# Patient Record
Sex: FEMALE | Race: WHITE | NOT HISPANIC OR LATINO | Employment: FULL TIME | URBAN - METROPOLITAN AREA
[De-identification: names, ages, dates, MRNs, and addresses within clinical notes are randomized per-mention and may not be internally consistent; named-entity substitution may affect disease eponyms.]

---

## 2021-03-01 ENCOUNTER — OFFICE VISIT (OUTPATIENT)
Dept: OBGYN CLINIC | Facility: CLINIC | Age: 36
End: 2021-03-01
Payer: COMMERCIAL

## 2021-03-01 VITALS
SYSTOLIC BLOOD PRESSURE: 130 MMHG | WEIGHT: 293 LBS | DIASTOLIC BLOOD PRESSURE: 80 MMHG | HEIGHT: 64 IN | BODY MASS INDEX: 50.02 KG/M2

## 2021-03-01 DIAGNOSIS — N83.209 CYST OF OVARY, UNSPECIFIED LATERALITY: Primary | ICD-10-CM

## 2021-03-01 DIAGNOSIS — Z01.419 ENCOUNTER FOR ANNUAL ROUTINE GYNECOLOGICAL EXAMINATION: ICD-10-CM

## 2021-03-01 DIAGNOSIS — Z01.419 CERVICAL SMEAR, AS PART OF ROUTINE GYNECOLOGICAL EXAMINATION: ICD-10-CM

## 2021-03-01 PROCEDURE — S0610 ANNUAL GYNECOLOGICAL EXAMINA: HCPCS | Performed by: OBSTETRICS & GYNECOLOGY

## 2021-03-01 RX ORDER — ESCITALOPRAM OXALATE 10 MG/1
10 TABLET ORAL DAILY
COMMUNITY

## 2021-03-01 NOTE — PROGRESS NOTES
Assessment/Plan:  New patient  Normal breast and gyn exam   IUD string seen  Family history colon cancer (mother)  48 lb weight gain since COVID pandemic  Mild depression treated with Lexapro x1 month with improvement of symptoms  Status post  x2  Considering pregnancy in the next year so  History of ovarian cyst, due for follow-up ultrasound  (Side unknown )    Plan:  Check pelvic ultrasound for follow-up of cyst bladder allergy unknown  Recommend healthy diet and exercise  Recommend vitamin-C vitamin-D and zinc   Recommend COVID-19 vaccine when available  Subjective:      Patient ID: Xander Aden is a 38 y o  female presents as new patient to the office  Patient transferred from a practice in [place] since she moved to this area  Patient states that at her yearly exam a year ago the IUD string was not seen  Patient had an ultrasound 2019 which showed the IUD in place however and ovarian cyst was noted  Patient does not know which side  A follow-up ultrasound in 2020 showed the cyst still present  She denies any pelvic pain  Her IUD is working well she denies any heavy bleeding  She has a history of 2 C sections and was considering getting pregnant in next year so  Repeat  was discussed  Patient has been depressed during this COVID episode over the last year she has gained 50 lb  She is not exercising regularly her watching her diet  She was followed recently for mild depression was placed on 10 mg of Lexapro  She has been on this dose 1 month since has seen an improvement  She has a follow-up with her PMD   She denies any breast bowel or bladder issues  No change in family history  Medications reviewed  Her C-sections were  and   Review of Systems   Constitutional: Negative  Negative for fatigue, fever and unexpected weight change  HENT: Negative  Eyes: Negative  Respiratory: Negative    Negative for chest tightness, shortness of breath, wheezing and stridor  Cardiovascular: Negative  Negative for chest pain, palpitations and leg swelling  Gastrointestinal: Negative  Negative for abdominal pain, blood in stool, diarrhea, nausea, rectal pain and vomiting  Endocrine: Negative  Genitourinary: Negative for dysuria, frequency, vaginal bleeding, vaginal discharge and vaginal pain  Musculoskeletal: Negative  Skin: Negative  Allergic/Immunologic: Negative  Neurological: Negative  Hematological: Negative  Psychiatric/Behavioral: Negative  All other systems reviewed and are negative  Objective:      /80   Ht 5' 4 17" (1 63 m)   Wt (!) 140 kg (308 lb)   LMP  (LMP Unknown)   BMI 52 58 kg/m²          Physical Exam  Constitutional:       Appearance: She is well-developed  HENT:      Head: Normocephalic and atraumatic  Neck:      Musculoskeletal: Normal range of motion and neck supple  Thyroid: No thyromegaly  Trachea: No tracheal deviation  Cardiovascular:      Rate and Rhythm: Normal rate and regular rhythm  Heart sounds: Normal heart sounds  Pulmonary:      Effort: Pulmonary effort is normal  No respiratory distress  Breath sounds: Normal breath sounds  No stridor  No wheezing or rales  Chest:      Chest wall: No tenderness  Breasts: Breasts are symmetrical          Right: No inverted nipple, mass, nipple discharge, skin change or tenderness  Left: No inverted nipple, mass, nipple discharge, skin change or tenderness  Abdominal:      General: Bowel sounds are normal  There is no distension  Palpations: Abdomen is soft  There is no mass  Tenderness: There is no abdominal tenderness  There is no guarding or rebound  Hernia: No hernia is present  There is no hernia in the left inguinal area  Genitourinary:     Labia:         Right: No rash, tenderness, lesion or injury  Left: No rash, tenderness, lesion or injury  Vagina: Normal  No signs of injury and foreign body  No vaginal discharge, erythema, tenderness or bleeding  Cervix: No cervical motion tenderness, discharge or friability  Uterus: Not deviated, not enlarged, not fixed and not tender  Adnexa:         Right: No mass, tenderness or fullness  Left: No mass, tenderness or fullness  Rectum: No mass, anal fissure, external hemorrhoid or internal hemorrhoid  Comments: Normal urethra  IUD string seen protruding from the cervical os  Lymphadenopathy:      Lower Body: No right inguinal adenopathy  No left inguinal adenopathy  Skin:     General: Skin is warm and dry  Neurological:      Mental Status: She is alert and oriented to person, place, and time  Psychiatric:         Behavior: Behavior normal          Thought Content:  Thought content normal          Judgment: Judgment normal

## 2021-03-01 NOTE — PROGRESS NOTES
The patient is here for a yearly  She is new to our office  The patient does want a pap smear- the patient wants us to hold it until she finds out when her last pap smear was  No bleeding or cramping  The patient had an ultrasound at [facility] and they found an ovarian cyst  The patient has no pelvic pain  The patient saw Dr Regino Ibarra of [facility] in [address] [place] [address]

## 2021-03-03 LAB — THIN PREP CVX: NORMAL

## 2021-03-05 ENCOUNTER — TELEPHONE (OUTPATIENT)
Dept: OBGYN CLINIC | Facility: CLINIC | Age: 36
End: 2021-03-05

## 2021-03-05 NOTE — TELEPHONE ENCOUNTER
Pt called to check if we received her records from her other doctor  We have and they are in pt's chart  Please review

## 2021-05-03 ENCOUNTER — IMMUNIZATIONS (OUTPATIENT)
Dept: FAMILY MEDICINE CLINIC | Facility: HOSPITAL | Age: 36
End: 2021-05-03

## 2021-05-03 DIAGNOSIS — Z23 ENCOUNTER FOR IMMUNIZATION: Primary | ICD-10-CM

## 2021-05-03 PROCEDURE — 0001A SARS-COV-2 / COVID-19 MRNA VACCINE (PFIZER-BIONTECH) 30 MCG: CPT

## 2021-05-03 PROCEDURE — 91300 SARS-COV-2 / COVID-19 MRNA VACCINE (PFIZER-BIONTECH) 30 MCG: CPT

## 2021-05-27 ENCOUNTER — IMMUNIZATIONS (OUTPATIENT)
Dept: FAMILY MEDICINE CLINIC | Facility: HOSPITAL | Age: 36
End: 2021-05-27

## 2021-05-27 DIAGNOSIS — Z23 ENCOUNTER FOR IMMUNIZATION: Primary | ICD-10-CM

## 2021-05-27 PROCEDURE — 91300 SARS-COV-2 / COVID-19 MRNA VACCINE (PFIZER-BIONTECH) 30 MCG: CPT

## 2021-05-27 PROCEDURE — 0002A SARS-COV-2 / COVID-19 MRNA VACCINE (PFIZER-BIONTECH) 30 MCG: CPT

## 2021-07-28 ENCOUNTER — TELEPHONE (OUTPATIENT)
Dept: UROLOGY | Facility: AMBULATORY SURGERY CENTER | Age: 36
End: 2021-07-28

## 2021-07-28 NOTE — TELEPHONE ENCOUNTER
Please Triage - [address] Patient-     What is the reason for the patients appointment? Micro hematuria       Imaging/Lab Results:      Do we accept the patient's insurance or is the patient Self-Pay? Provider & Plan: Maria C Otto  Member ID#: [id number]     Has the patient had any previous urologist(s)? No        Have patient records been requested?  Pt will have pcp fax over her records to [phone]       Has the patient had any outside testing done? no      Does the patient have a personal history of cancer? no      Patient can be reached at :[phone]

## 2021-07-29 ENCOUNTER — TELEPHONE (OUTPATIENT)
Dept: UROLOGY | Facility: CLINIC | Age: 36
End: 2021-07-29

## 2021-08-11 NOTE — PROGRESS NOTES
2021      Chief Complaint   Patient presents with    Microhematuria     Assessment and Plan    39 y o  female new patient     1  Microscopic hematuria  - UA micro from 21 showed 3-10 RBCs  Additionally UA micro from 21 showed 3-10 RBCs  Both samples in absence of UTI     - Given risk stratification will obtain renal US and will return for cystoscopy for further evaluation    - Urine dip + for blood and leukocytes today, negative nitrites  Will send out for UA micro  - Return for cystoscopy  History of Present Illness  Damián Conner is a 39 y o  female here for new patient evaluation of microscopic hematuria  UA micro from 21 showed 3-10 RBCs  Additionally UA micro from 21 showed 3-10 RBCs  Both samples in absence of UTI  She denies every smoking  Does report a family history of prostate cancer in her grandfather  Denies any family history of other  malignancy  Denies any history of chemical exposures  She denies any urinary symptoms such as dysuria, frequency, urgency, incontinence, flank pain, suprapubic pain, or gross hematuria  She denies any prior urologic history or prior  surgical manipulation  Denies any blood thinners or prior pelvic trauma  She has history of having 2 children via   She does have Mirena IUD in place and reports she does not have regular menstrual periods, does occasionally have vaginal spotting  She denies any medical history other than morbid obesity  Urine dip positive for leukocytes and blood  Negative for nitrites  PVR=11 mL     Review of Systems   Constitutional: Negative for chills and fever  Respiratory: Negative for shortness of breath  Cardiovascular: Negative for chest pain  Gastrointestinal: Negative for abdominal pain, constipation, diarrhea, nausea and vomiting     Genitourinary: Negative for difficulty urinating, dysuria, flank pain, frequency, hematuria, pelvic pain, urgency, vaginal bleeding and vaginal pain    Skin: Negative for rash  Allergic/Immunologic: Negative for immunocompromised state  Neurological: Negative for dizziness  Hematological: Does not bruise/bleed easily  Past Medical History  History reviewed  No pertinent past medical history  Past Social History  Past Surgical History:   Procedure Laterality Date     SECTION       and      Social History     Tobacco Use   Smoking Status Never Smoker   Smokeless Tobacco Never Used       Past Family History  Family History   Problem Relation Age of Onset    Colon cancer Maternal Grandmother     Breast cancer Family        Past Social history  Social History     Socioeconomic History    Marital status: /Civil Union     Spouse name: Not on file    Number of children: Not on file    Years of education: Not on file    Highest education level: Not on file   Occupational History    Not on file   Tobacco Use    Smoking status: Never Smoker    Smokeless tobacco: Never Used   Vaping Use    Vaping Use: Never used   Substance and Sexual Activity    Alcohol use: Yes    Drug use: Not on file    Sexual activity: Yes     Birth control/protection: I U D  Other Topics Concern    Not on file   Social History Narrative    Not on file     Social Determinants of Health     Financial Resource Strain:     Difficulty of Paying Living Expenses:    Food Insecurity:     Worried About Running Out of Food in the Last Year:     [address] in the Last Year:    Transportation Needs:     Lack of Transportation (Medical):      Lack of Transportation (Non-Medical):    Physical Activity:     Days of Exercise per Week:     Minutes of Exercise per Session:    Stress:     Feeling of Stress :    Social Connections:     Frequency of Communication with Friends and Family:     Frequency of Social Gatherings with Friends and Family:     Attends Evangelical Services:     Active Member of Clubs or Organizations:     Attends Club or Organization Meetings:     Marital Status:    Intimate Partner Violence:     Fear of Current or Ex-Partner:     Emotionally Abused:     Physically Abused:     Sexually Abused:        Current Medications  Current Outpatient Medications   Medication Sig Dispense Refill    escitalopram (LEXAPRO) 10 mg tablet Take 10 mg by mouth daily      levonorgestrel (MIRENA) 20 MCG/24HR IUD 1 each by Intrauterine route once       No current facility-administered medications for this visit  Allergies  No Known Allergies      The following portions of the patient's history were reviewed and updated as appropriate: allergies, current medications, past medical history, past social history, past surgical history and problem list       Vitals  Vitals:    08/12/21 1053   BP: 122/86   BP Location: Left arm   Patient Position: Sitting   Cuff Size: Large   Pulse: 80   Weight: (!) 142 kg (312 lb)   Height: 5' 4" (1 626 m)           Physical Exam  Physical Exam  Constitutional:       Appearance: Normal appearance  She is obese  HENT:      Head: Normocephalic and atraumatic  Right Ear: External ear normal       Left Ear: External ear normal    Eyes:      General: No scleral icterus  Conjunctiva/sclera: Conjunctivae normal    Cardiovascular:      Pulses: Normal pulses  Pulmonary:      Effort: Pulmonary effort is normal    Musculoskeletal:         General: Normal range of motion  Cervical back: Normal range of motion  Skin:     General: Skin is warm and dry  Neurological:      General: No focal deficit present  Mental Status: She is alert and oriented to person, place, and time  Psychiatric:         Mood and Affect: Mood normal          Behavior: Behavior normal          Thought Content:  Thought content normal          Judgment: Judgment normal            Results  Recent Results (from the past 1 hour(s))   POCT urine dip    Collection Time: 08/12/21 10:56 AM   Result Value Ref Range    LEUKOCYTE ESTERASE,UA ++     NITRITE,UA -     SL AMB POCT UROBILINOGEN 0 2     POCT URINE PROTEIN -      PH,UA 7 0     BLOOD,UA ++     SPECIFIC GRAVITY,UA 1 015     KETONES,UA -     BILIRUBIN,UA -     GLUCOSE, UA -      COLOR,UA yellow     CLARITY,UA clear    POCT Measure PVR    Collection Time: 08/12/21 10:57 AM   Result Value Ref Range    POST-VOID RESIDUAL VOLUME, ML POC 11 mL   ]  No results found for: PSA  No results found for: GLUCOSE, CALCIUM, NA, K, CO2, CL, BUN, CREATININE  No results found for: WBC, HGB, HCT, MCV, PLT        Orders  Orders Placed This Encounter   Procedures    POCT Measure PVR    POCT urine dip       Troy Marin PA-C

## 2021-08-12 ENCOUNTER — CONSULT (OUTPATIENT)
Dept: UROLOGY | Facility: CLINIC | Age: 36
End: 2021-08-12
Payer: COMMERCIAL

## 2021-08-12 VITALS
WEIGHT: 293 LBS | BODY MASS INDEX: 50.02 KG/M2 | SYSTOLIC BLOOD PRESSURE: 122 MMHG | HEART RATE: 80 BPM | DIASTOLIC BLOOD PRESSURE: 86 MMHG | HEIGHT: 64 IN

## 2021-08-12 DIAGNOSIS — R31.29 MICROSCOPIC HEMATURIA: Primary | ICD-10-CM

## 2021-08-12 LAB
BACTERIA UR QL AUTO: ABNORMAL /HPF
BILIRUB UR QL STRIP: NEGATIVE
CLARITY UR: ABNORMAL
COLOR UR: YELLOW
GLUCOSE UR STRIP-MCNC: NEGATIVE MG/DL
HGB UR QL STRIP.AUTO: ABNORMAL
HYALINE CASTS #/AREA URNS LPF: ABNORMAL /LPF
KETONES UR STRIP-MCNC: NEGATIVE MG/DL
LEUKOCYTE ESTERASE UR QL STRIP: ABNORMAL
NITRITE UR QL STRIP: NEGATIVE
NON-SQ EPI CELLS URNS QL MICRO: ABNORMAL /HPF
PH UR STRIP.AUTO: 7 [PH]
POST-VOID RESIDUAL VOLUME, ML POC: 11 ML
PROT UR STRIP-MCNC: NEGATIVE MG/DL
RBC #/AREA URNS AUTO: ABNORMAL /HPF
SL AMB  POCT GLUCOSE, UA: NORMAL
SL AMB LEUKOCYTE ESTERASE,UA: NORMAL
SL AMB POCT BILIRUBIN,UA: NORMAL
SL AMB POCT BLOOD,UA: NORMAL
SL AMB POCT CLARITY,UA: CLEAR
SL AMB POCT COLOR,UA: YELLOW
SL AMB POCT KETONES,UA: NORMAL
SL AMB POCT NITRITE,UA: NORMAL
SL AMB POCT PH,UA: 7
SL AMB POCT SPECIFIC GRAVITY,UA: 1.01
SL AMB POCT URINE PROTEIN: NORMAL
SL AMB POCT UROBILINOGEN: 0.2
SP GR UR STRIP.AUTO: 1.02 (ref 1–1.03)
UROBILINOGEN UR QL STRIP.AUTO: 0.2 E.U./DL
WBC #/AREA URNS AUTO: ABNORMAL /HPF

## 2021-08-12 PROCEDURE — 51798 US URINE CAPACITY MEASURE: CPT | Performed by: PHYSICIAN ASSISTANT

## 2021-08-12 PROCEDURE — 1036F TOBACCO NON-USER: CPT | Performed by: PHYSICIAN ASSISTANT

## 2021-08-12 PROCEDURE — 81002 URINALYSIS NONAUTO W/O SCOPE: CPT | Performed by: PHYSICIAN ASSISTANT

## 2021-08-12 PROCEDURE — 99203 OFFICE O/P NEW LOW 30 MIN: CPT | Performed by: PHYSICIAN ASSISTANT

## 2021-08-12 PROCEDURE — 81001 URINALYSIS AUTO W/SCOPE: CPT | Performed by: PHYSICIAN ASSISTANT

## 2021-08-16 ENCOUNTER — HOSPITAL ENCOUNTER (OUTPATIENT)
Dept: ULTRASOUND IMAGING | Facility: HOSPITAL | Age: 36
Discharge: HOME | End: 2021-08-16
Payer: COMMERCIAL

## 2021-08-16 DIAGNOSIS — R31.29 MICROSCOPIC HEMATURIA: ICD-10-CM

## 2021-08-16 PROCEDURE — 76770 US EXAM ABDO BACK WALL COMP: CPT

## 2021-08-19 ENCOUNTER — TELEPHONE (OUTPATIENT)
Dept: UROLOGY | Facility: CLINIC | Age: 36
End: 2021-08-19

## 2021-08-19 NOTE — TELEPHONE ENCOUNTER
Left message to schedule cysto with Dr Andrew Jaime  Patient completed renal ultrasound that needed to be done prior to appointment      (Return for cystoscopy (non-urgent), obtain renal US)

## 2021-09-01 PROBLEM — R31.29 MICROSCOPIC HEMATURIA: Status: ACTIVE | Noted: 2021-09-01

## 2021-09-01 PROCEDURE — 52000 CYSTOURETHROSCOPY: CPT | Performed by: UROLOGY

## 2021-09-01 NOTE — ASSESSMENT & PLAN NOTE
The patient has had a negative hematuria workup  Recommend annual urinalysis with PCP and consideration for repeat workup in 3-5 years if micro hematuria persists  If the patient has gross hematuria then they should see us immediately

## 2021-09-01 NOTE — PROGRESS NOTES
Assessment/Plan:    Microscopic hematuria  The patient has had a negative hematuria workup  Recommend annual urinalysis with PCP and consideration for repeat workup in 3-5 years if micro hematuria persists  If the patient has gross hematuria then they should see us immediately  Subjective:      Patient ID: Michael Holcomb is a 39 y o  female  HPI    Michael Holcomb is a 39 y o  female here for new patient evaluation of microscopic hematuria  UA micro from 21 showed 3-10 RBCs  Additionally UA micro from 21 showed 3-10 RBCs  Both samples in absence of UTI  She denies ever smoking  Does report a family history of prostate cancer in her grandfather  Denies any family history of other  malignancy  Denies any history of chemical exposures  She denies any urinary symptoms such as dysuria, frequency, urgency, incontinence, flank pain, suprapubic pain, or gross hematuria  She denies any prior urologic history or prior  surgical manipulation  Denies any blood thinners or prior pelvic trauma  She has history of having 2 children via   She does have Mirena IUD in place and reports she does not have regular menstrual periods, does occasionally have vaginal spotting  She denies any medical history other than morbid obesity  Renal bladder ultrasound performed based on risk stratification and was unremarkable  Patient here for cystoscopy today         PVR=11 mL at prior visit      Review of Systems   Constitutional: Negative for chills and fever  HENT: Negative for ear pain and sore throat  Eyes: Negative for pain and visual disturbance  Respiratory: Negative for cough and shortness of breath  Cardiovascular: Negative for chest pain and palpitations  Gastrointestinal: Negative for abdominal pain and vomiting  Genitourinary: Negative for dysuria and hematuria  Musculoskeletal: Negative for arthralgias and back pain     Skin: Negative for color change and rash    Neurological: Negative for seizures and syncope  All other systems reviewed and are negative  Objective:      /70   Pulse 96   Ht 5' 4" (1 626 m)   Wt (!) 141 kg (310 lb)   BMI 53 21 kg/m²          Physical Exam  Vitals reviewed  Constitutional:       General: She is not in acute distress  Appearance: Normal appearance  She is obese  She is not ill-appearing, toxic-appearing or diaphoretic  HENT:      Head: Normocephalic and atraumatic  Eyes:      Extraocular Movements: Extraocular movements intact  Pupils: Pupils are equal, round, and reactive to light  Pulmonary:      Effort: Pulmonary effort is normal    Abdominal:      General: Abdomen is flat  There is no distension  Palpations: Abdomen is soft  There is no mass  Tenderness: There is no abdominal tenderness  There is no guarding or rebound  Hernia: No hernia is present  Skin:     General: Skin is warm  Neurological:      General: No focal deficit present  Mental Status: She is alert and oriented to person, place, and time  Mental status is at baseline  Psychiatric:         Mood and Affect: Mood normal          Behavior: Behavior normal          Thought Content: Thought content normal            RENAL ULTRASOUND     INDICATION:   R31 29: Other microscopic hematuria      COMPARISON: None     TECHNIQUE:   Ultrasound of the retroperitoneum was performed with a curvilinear transducer utilizing volumetric sweeps and still imaging techniques       FINDINGS:     KIDNEYS:  Symmetric and normal size  Right kidney:  11 6 x 4 4 x 4 5 cm  Left kidney:  11 7 x 4 6 x 5 cm      Right kidney  Normal echogenicity and contour  No suspicious masses detected  No hydronephrosis  No shadowing calculi  No perinephric fluid collections      Left kidney  Normal echogenicity and contour  No suspicious masses detected  No hydronephrosis  No shadowing calculi    No perinephric fluid collections      URETERS:  Nonvisualized      BLADDER:   Normally distended  No focal thickening or mass lesions  Bilateral ureteral jets detected         IMPRESSION:  1  No acute findings         Cystoscopy     Date/Time 9/1/2021 4:18 PM     Performed by  Beatrice Valenzuela MD     Authorized by Beatrice Valenzuela MD      Universal Protocol:  Consent: Written consent obtained  Risks and benefits: risks, benefits and alternatives were discussed  Consent given by: patient  Time out: Immediately prior to procedure a "time out" was called to verify the correct patient, procedure, equipment, support staff and site/side marked as required  Patient understanding: patient states understanding of the procedure being performed  Patient consent: the patient's understanding of the procedure matches consent given  Procedure consent: procedure consent matches procedure scheduled  Patient identity confirmed: verbally with patient        Procedure Details:  Procedure type: cystoscopy    Patient tolerance: Patient tolerated the procedure well with no immediate complications    Additional Procedure Details: A female MA was in the room and served as a chaperone and assistant    The patient's external genitals were sterilely prepped and draped  Viscous lidocaine was introduced into the urethra  A flexible cystoscope was introduced into the urethra    Urethra: Normal  Bladder: No lesions  Ureteral orifices in orthotopic position    No diverticula or trabeculations

## 2021-09-02 ENCOUNTER — PROCEDURE VISIT (OUTPATIENT)
Dept: UROLOGY | Facility: CLINIC | Age: 36
End: 2021-09-02
Payer: COMMERCIAL

## 2021-09-02 VITALS
DIASTOLIC BLOOD PRESSURE: 70 MMHG | SYSTOLIC BLOOD PRESSURE: 132 MMHG | BODY MASS INDEX: 50.02 KG/M2 | HEIGHT: 64 IN | HEART RATE: 96 BPM | WEIGHT: 293 LBS

## 2021-09-02 DIAGNOSIS — R31.29 MICROSCOPIC HEMATURIA: Primary | ICD-10-CM

## 2021-09-02 PROCEDURE — 3008F BODY MASS INDEX DOCD: CPT | Performed by: PHYSICIAN ASSISTANT

## 2021-09-15 ENCOUNTER — TELEPHONE (OUTPATIENT)
Dept: UROLOGY | Facility: CLINIC | Age: 36
End: 2021-09-15

## 2021-09-15 NOTE — TELEPHONE ENCOUNTER
Pt is on the recall list for Dr Ton Dwyer- Return for cystoscopy (non-urgent), obtain renal US  Attempted to reach pt 3 times - certified letter sent

## 2021-09-20 NOTE — TELEPHONE ENCOUNTER
Patient called stating she already had a cysto done on 09/02 and she did not to have make a follow up unless any problems  She is doing fine

## 2022-03-03 ENCOUNTER — ANNUAL EXAM (OUTPATIENT)
Dept: OBGYN CLINIC | Facility: CLINIC | Age: 37
End: 2022-03-03
Payer: COMMERCIAL

## 2022-03-03 DIAGNOSIS — Z11.51 SCREENING FOR HPV (HUMAN PAPILLOMAVIRUS): ICD-10-CM

## 2022-03-03 DIAGNOSIS — Z01.419 ENCOUNTER FOR ANNUAL ROUTINE GYNECOLOGICAL EXAMINATION: ICD-10-CM

## 2022-03-03 DIAGNOSIS — Z01.419 ROUTINE GYNECOLOGICAL EXAMINATION: Primary | ICD-10-CM

## 2022-03-03 PROCEDURE — S0612 ANNUAL GYNECOLOGICAL EXAMINA: HCPCS | Performed by: OBSTETRICS & GYNECOLOGY

## 2022-03-03 NOTE — PROGRESS NOTES
Assessment/Plan:    Normal breast and gyn exam  IUD in place  Recently diagnosed with benign microscopic hematuria  Followed by Urology  Normal Pap smear 2021  COVID and flu vaccine    Plan:  Check Pap smear  Patient considering removing IUD in getting pregnant  Recommend prenatal vitamin  Recommend healthy diet exercise  Subjective:      Patient ID: Nola Bhatt is a 39 y o  female presents for yearly exam with no complaints  Patient presently has an IUD and has no pelvic pain or bleeding  Patient considering pregnancy in the next year  Denies any breast bowel or bladder issues  No change in family history  Maternal grandmother colon cancer  maternal cousin breast cancer  Medications reviewed  Review of Systems   Constitutional: Negative  Negative for fatigue, fever and unexpected weight change  HENT: Negative  Eyes: Negative  Respiratory: Negative  Negative for chest tightness, shortness of breath, wheezing and stridor  Cardiovascular: Negative  Negative for chest pain, palpitations and leg swelling  Gastrointestinal: Negative  Negative for abdominal pain, blood in stool, diarrhea, nausea, rectal pain and vomiting  Endocrine: Negative  Genitourinary: Negative for dysuria, frequency, vaginal bleeding, vaginal discharge and vaginal pain  Musculoskeletal: Negative  Skin: Negative  Allergic/Immunologic: Negative  Neurological: Negative  Hematological: Negative  Psychiatric/Behavioral: Negative  All other systems reviewed and are negative  Objective: There were no vitals taken for this visit  Physical Exam  Constitutional:       Appearance: She is well-developed  HENT:      Head: Normocephalic and atraumatic  Neck:      Thyroid: No thyromegaly  Trachea: No tracheal deviation  Cardiovascular:      Rate and Rhythm: Normal rate and regular rhythm  Heart sounds: Normal heart sounds     Pulmonary:      Effort: Pulmonary effort is normal  No respiratory distress  Breath sounds: Normal breath sounds  No stridor  No wheezing or rales  Chest:      Chest wall: No tenderness  Breasts: Breasts are symmetrical       Right: No inverted nipple, mass, nipple discharge, skin change or tenderness  Left: No inverted nipple, mass, nipple discharge, skin change or tenderness  Abdominal:      General: Bowel sounds are normal  There is no distension  Palpations: Abdomen is soft  There is no mass  Tenderness: There is no abdominal tenderness  There is no guarding or rebound  Hernia: No hernia is present  There is no hernia in the left inguinal area  Genitourinary:     Labia:         Right: No rash, tenderness, lesion or injury  Left: No rash, tenderness, lesion or injury  Vagina: Normal  No signs of injury and foreign body  No vaginal discharge, erythema, tenderness or bleeding  Cervix: No cervical motion tenderness, discharge or friability  Uterus: Not deviated, not enlarged, not fixed and not tender  Adnexa:         Right: No mass, tenderness or fullness  Left: No mass, tenderness or fullness  Rectum: No mass, anal fissure, external hemorrhoid or internal hemorrhoid  Musculoskeletal:      Cervical back: Normal range of motion and neck supple  Lymphadenopathy:      Lower Body: No right inguinal adenopathy  No left inguinal adenopathy  Skin:     General: Skin is warm and dry  Neurological:      Mental Status: She is alert and oriented to person, place, and time  Psychiatric:         Behavior: Behavior normal          Thought Content:  Thought content normal          Judgment: Judgment normal

## 2022-03-03 NOTE — PROGRESS NOTES
The patient is here for a yearly  The patient is due for a pap smear  Pap normal 3/1/21, pap normal HPV neg 11/13/17

## 2023-03-23 ENCOUNTER — ANNUAL EXAM (OUTPATIENT)
Dept: GYNECOLOGY | Facility: CLINIC | Age: 38
End: 2023-03-23

## 2023-03-23 VITALS
SYSTOLIC BLOOD PRESSURE: 110 MMHG | HEIGHT: 64 IN | WEIGHT: 284 LBS | DIASTOLIC BLOOD PRESSURE: 82 MMHG | BODY MASS INDEX: 48.49 KG/M2

## 2023-03-23 DIAGNOSIS — N64.4 BREAST PAIN, LEFT: ICD-10-CM

## 2023-03-23 DIAGNOSIS — Z01.419 ENCOUNTER FOR ANNUAL ROUTINE GYNECOLOGICAL EXAMINATION: Primary | ICD-10-CM

## 2023-03-23 NOTE — PROGRESS NOTES
Assessment/Plan:  Normal breast and GYN exam  Left breast pain x6 months (unchanged)  Normal Pap negative HPV 2022  IUD  COVID-vaccine    Plan: Rx diagnostic mammogram   Encouraged healthy diet and exercise  Subjective:      Patient ID: Monika Brink is a 44 y o  female presents for annual exam complaining of left breast pain over the last 6 months  No change in intensity over this time  Denies any masses skin changes axillary nodes or nipple discharge  No complaints concerning her right breast   Patient had a breast ultrasound approximately 4 years ago for what she thought was a mass  This was done in [place] and the findings were benign  She never had a mammogram     Has an IUD in place and gets occasional spotting  Denies any pelvic pain or dyspareunia  Denies any bowel problems  Has a history of microscopic hematuria which was evaluated by urology  No change in family history  Maternal grandmother:(colon cancer)  Medications reviewed  Patient has been working from home full-time  Review of Systems   Constitutional: Negative  Negative for fatigue, fever and unexpected weight change  HENT: Negative  Eyes: Negative  Respiratory: Negative  Negative for chest tightness, shortness of breath, wheezing and stridor  Cardiovascular: Negative  Negative for chest pain, palpitations and leg swelling  Gastrointestinal: Negative  Negative for abdominal pain, blood in stool, diarrhea, nausea, rectal pain and vomiting  Endocrine: Negative  Genitourinary: Negative for dysuria, frequency, vaginal bleeding, vaginal discharge and vaginal pain  Left breast pain   Musculoskeletal: Negative  Skin: Negative  Allergic/Immunologic: Negative  Neurological: Negative  Hematological: Negative  Psychiatric/Behavioral: Negative  All other systems reviewed and are negative          Objective:      Ht 5' 4" (1 626 m)   Wt 129 kg (284 lb)   BMI 48 75 kg/m² Physical Exam  Constitutional:       Appearance: She is well-developed  HENT:      Head: Normocephalic and atraumatic  Neck:      Thyroid: No thyromegaly  Trachea: No tracheal deviation  Cardiovascular:      Rate and Rhythm: Normal rate and regular rhythm  Heart sounds: Normal heart sounds  Pulmonary:      Effort: Pulmonary effort is normal  No respiratory distress  Breath sounds: Normal breath sounds  No stridor  No wheezing or rales  Chest:      Chest wall: No tenderness  Breasts:     Breasts are symmetrical       Right: No inverted nipple, mass, nipple discharge, skin change or tenderness  Left: No inverted nipple, mass, nipple discharge, skin change or tenderness  Comments: Area of concern  Abdominal:      General: Bowel sounds are normal  There is no distension  Palpations: Abdomen is soft  There is no mass  Tenderness: There is no abdominal tenderness  There is no guarding or rebound  Hernia: No hernia is present  There is no hernia in the left inguinal area  Genitourinary:     Labia:         Right: No rash, tenderness, lesion or injury  Left: No rash, tenderness, lesion or injury  Vagina: Normal  No signs of injury and foreign body  No vaginal discharge, erythema, tenderness or bleeding  Cervix: No cervical motion tenderness, discharge or friability  Uterus: Not deviated, not enlarged, not fixed and not tender  Adnexa:         Right: No mass, tenderness or fullness  Left: No mass, tenderness or fullness  Rectum: No mass, anal fissure, external hemorrhoid or internal hemorrhoid  Comments: IUD string seen  Musculoskeletal:      Cervical back: Normal range of motion and neck supple  Lymphadenopathy:      Lower Body: No right inguinal adenopathy  No left inguinal adenopathy  Skin:     General: Skin is warm and dry     Neurological:      Mental Status: She is alert and oriented to person, place, and time    Psychiatric:         Behavior: Behavior normal          Thought Content:  Thought content normal          Judgment: Judgment normal

## 2023-09-01 ENCOUNTER — NEW PATIENT COMPREHENSIVE (OUTPATIENT)
Dept: URBAN - METROPOLITAN AREA CLINIC 6 | Facility: CLINIC | Age: 38
End: 2023-09-01

## 2023-09-01 DIAGNOSIS — Z01.00: ICD-10-CM

## 2023-09-01 DIAGNOSIS — H52.202: ICD-10-CM

## 2023-09-01 DIAGNOSIS — H52.13: ICD-10-CM

## 2023-09-01 PROCEDURE — 92015 DETERMINE REFRACTIVE STATE: CPT

## 2023-09-01 PROCEDURE — 92004 COMPRE OPH EXAM NEW PT 1/>: CPT

## 2023-09-01 ASSESSMENT — TONOMETRY
OS_IOP_MMHG: 16
OD_IOP_MMHG: 16

## 2023-09-01 ASSESSMENT — VISUAL ACUITY
OS_CC: 20/20
OD_CC: 20/20-1

## 2024-03-25 ENCOUNTER — ANNUAL EXAM (OUTPATIENT)
Dept: GYNECOLOGY | Facility: CLINIC | Age: 39
End: 2024-03-25
Payer: COMMERCIAL

## 2024-03-25 VITALS
SYSTOLIC BLOOD PRESSURE: 144 MMHG | BODY MASS INDEX: 50.02 KG/M2 | DIASTOLIC BLOOD PRESSURE: 84 MMHG | WEIGHT: 293 LBS | HEIGHT: 64 IN

## 2024-03-25 DIAGNOSIS — Z01.419 ENCOUNTER FOR GYNECOLOGICAL EXAMINATION WITHOUT ABNORMAL FINDING: ICD-10-CM

## 2024-03-25 DIAGNOSIS — Z12.31 SCREENING MAMMOGRAM FOR BREAST CANCER: Primary | ICD-10-CM

## 2024-03-25 DIAGNOSIS — E66.01 CLASS 2 SEVERE OBESITY DUE TO EXCESS CALORIES WITH SERIOUS COMORBIDITY IN ADULT, UNSPECIFIED BMI (HCC): ICD-10-CM

## 2024-03-25 PROCEDURE — S0612 ANNUAL GYNECOLOGICAL EXAMINA: HCPCS | Performed by: OBSTETRICS & GYNECOLOGY

## 2024-03-25 NOTE — PROGRESS NOTES
"Assessment/:  Normal breast and GYN exam  Difficulty losing weight  History of left breast pain.  Normal Pap smear negative HPV 2023  IUD    Plan: Referral to weight loss management.  Rx mammogram.  Due for IUD replacement next year.      Subjective:  c/s     Patient ID: Teri Griggs is a 38 y.o. female presents to the office for annual exam complaining of continued left breast soreness.  Never went for her mammogram which was ordered.  Her grandfather just passed away who she was very close to.  Recently seen by PMD and noted to have elevated blood sugar.  Having difficulty trying to lose weight on her own.  Denies any pelvic pain vaginal bleeding or dyspareunia.  Denies any breast bowel or bladder issues.  Has an IUD and due for removal next year.  Was considering pregnancy but also considering an IUD replacement next year.        Review of Systems   Constitutional: Negative.  Negative for fatigue, fever and unexpected weight change.   HENT: Negative.     Eyes: Negative.    Respiratory: Negative.  Negative for chest tightness, shortness of breath, wheezing and stridor.    Cardiovascular: Negative.  Negative for chest pain, palpitations and leg swelling.   Gastrointestinal: Negative.  Negative for abdominal pain, blood in stool, diarrhea, nausea, rectal pain and vomiting.   Endocrine: Negative.    Genitourinary:  Negative for dysuria, frequency, vaginal bleeding, vaginal discharge and vaginal pain.   Musculoskeletal: Negative.    Skin: Negative.    Allergic/Immunologic: Negative.    Neurological: Negative.    Hematological: Negative.    Psychiatric/Behavioral: Negative.     All other systems reviewed and are negative.        Objective:      /84   Ht 5' 4\" (1.626 m)   Wt (!) 144 kg (318 lb)   BMI 54.58 kg/m²          Physical Exam  Constitutional:       Appearance: She is well-developed. She is obese.   HENT:      Head: Normocephalic and atraumatic.   Neck:      Thyroid: No thyromegaly.      " Trachea: No tracheal deviation.   Cardiovascular:      Rate and Rhythm: Normal rate and regular rhythm.      Heart sounds: Normal heart sounds.   Pulmonary:      Effort: Pulmonary effort is normal. No respiratory distress.      Breath sounds: Normal breath sounds. No stridor. No wheezing or rales.   Chest:      Chest wall: No tenderness.   Breasts:     Breasts are symmetrical.      Right: No inverted nipple, mass, nipple discharge, skin change or tenderness.      Left: No inverted nipple, mass, nipple discharge, skin change or tenderness.   Abdominal:      General: Bowel sounds are normal. There is no distension.      Palpations: Abdomen is soft. There is no mass.      Tenderness: There is no abdominal tenderness. There is no guarding or rebound.      Hernia: No hernia is present. There is no hernia in the left inguinal area.   Genitourinary:     Labia:         Right: No rash, tenderness, lesion or injury.         Left: No rash, tenderness, lesion or injury.       Vagina: Normal. No signs of injury and foreign body. No vaginal discharge, erythema, tenderness or bleeding.      Cervix: No cervical motion tenderness, discharge or friability.      Uterus: Not deviated, not enlarged, not fixed and not tender.       Adnexa:         Right: No mass, tenderness or fullness.          Left: No mass, tenderness or fullness.        Rectum: No mass, anal fissure, external hemorrhoid or internal hemorrhoid.   Musculoskeletal:      Cervical back: Normal range of motion and neck supple.   Lymphadenopathy:      Lower Body: No right inguinal adenopathy. No left inguinal adenopathy.   Skin:     General: Skin is warm and dry.   Neurological:      Mental Status: She is alert and oriented to person, place, and time.   Psychiatric:         Behavior: Behavior normal.         Thought Content: Thought content normal.         Judgment: Judgment normal.

## 2024-05-15 ENCOUNTER — HOSPITAL ENCOUNTER (OUTPATIENT)
Dept: RADIOLOGY | Facility: IMAGING CENTER | Age: 39
Discharge: HOME | End: 2024-05-15
Payer: COMMERCIAL

## 2024-05-15 VITALS — BODY MASS INDEX: 50.02 KG/M2 | WEIGHT: 293 LBS | HEIGHT: 64 IN

## 2024-05-15 DIAGNOSIS — Z12.31 SCREENING MAMMOGRAM FOR BREAST CANCER: ICD-10-CM

## 2024-05-15 PROCEDURE — 77067 SCR MAMMO BI INCL CAD: CPT

## 2024-05-15 PROCEDURE — 77063 BREAST TOMOSYNTHESIS BI: CPT

## 2024-05-20 ENCOUNTER — TELEPHONE (OUTPATIENT)
Age: 39
End: 2024-05-20

## 2024-05-20 NOTE — TELEPHONE ENCOUNTER
Incoming call from patient to review mammogram results. Reviewed results and note from provider. Patient aware to continue routine screening in 1 year for both breasts. Patient verbalized understanding. No further questions at this time.       Bora Fields MD  5/20/2024  8:16 AM EDT   Normal mammogram

## 2024-07-18 ENCOUNTER — OFFICE VISIT (OUTPATIENT)
Dept: GASTROENTEROLOGY | Facility: AMBULARY SURGERY CENTER | Age: 39
End: 2024-07-18
Payer: COMMERCIAL

## 2024-07-18 VITALS
SYSTOLIC BLOOD PRESSURE: 126 MMHG | HEART RATE: 81 BPM | HEIGHT: 64 IN | DIASTOLIC BLOOD PRESSURE: 84 MMHG | BODY MASS INDEX: 50.02 KG/M2 | WEIGHT: 293 LBS | OXYGEN SATURATION: 99 %

## 2024-07-18 DIAGNOSIS — Z80.0 FAMILY HISTORY OF COLON CANCER: Primary | ICD-10-CM

## 2024-07-18 DIAGNOSIS — Z83.719 FAMILY HISTORY OF COLONIC POLYPS: ICD-10-CM

## 2024-07-18 PROCEDURE — 99243 OFF/OP CNSLTJ NEW/EST LOW 30: CPT | Performed by: INTERNAL MEDICINE

## 2024-07-18 RX ORDER — BISACODYL 5 MG/1
10 TABLET, DELAYED RELEASE ORAL ONCE
Qty: 2 TABLET | Refills: 0 | Status: SHIPPED | OUTPATIENT
Start: 2024-07-18 | End: 2024-07-18

## 2024-07-18 NOTE — PROGRESS NOTES
Consultation -  [facility]  Teri Masseymildred 1985 female         ASSESSMENT & PLAN:    Family history of colon cancer  Patient is at increased risks for colon cancer screening because of family history of colon cancer in her grandmother and colon polyps in her mother.    -Schedule for colonoscopy.    -High-fiber diet.    -Patient was given instructions about the colonoscopy prep.    -Patient was explained about the risks and benefits of the procedure. Risks including but not limited to bleeding, infection, perforation were explained in detail. Also explained about less than 100% sensitivity with the exam and other alternatives.      Chief Complaint: For colonoscopy    HPI: 39-year-old female with history of anxiety was referred for colonoscopy.  Her grandmother had colon cancer at age 50.  Her mother and uncles had colon polyps.  Patient had a colonoscopy 5 years ago.  Patient has regular bowel movements and denies any blood or mucus in the stool.  Appetite is good and denies any recent weight loss.  Denies any abdominal pain, nausea, or vomiting.  Has no heartburn or acid reflux.  Denies any difficulty swallowing.    Chaperon: Ms. Rodriguez    REVIEW OF SYSTEMS: Review of Systems   Constitutional:  Negative for activity change, appetite change, chills, diaphoresis, fatigue, fever and unexpected weight change.   HENT:  Negative for ear discharge, ear pain, facial swelling, hearing loss, nosebleeds, sore throat, tinnitus and voice change.    Eyes:  Negative for pain, discharge, redness, itching and visual disturbance.   Respiratory:  Negative for apnea, cough, chest tightness, shortness of breath and wheezing.    Cardiovascular:  Negative for chest pain and palpitations.   Gastrointestinal:         As noted in HPI   Endocrine: Negative for cold intolerance, heat intolerance and polyuria.   Genitourinary:  Positive for hematuria. Negative for difficulty urinating, dysuria, flank pain and urgency.    Musculoskeletal:  Negative for arthralgias, back pain, gait problem, joint swelling and myalgias.   Skin:  Negative for rash and wound.   Neurological:  Negative for dizziness, tremors, seizures, speech difficulty, light-headedness, numbness and headaches.   Hematological:  Negative for adenopathy. Does not bruise/bleed easily.   Psychiatric/Behavioral:  Negative for agitation, behavioral problems and confusion. The patient is not nervous/anxious.         History reviewed. No pertinent past medical history.   Past Surgical History:   Procedure Laterality Date     SECTION       and      Social History     Socioeconomic History    Marital status: /Civil Union     Spouse name: Not on file    Number of children: Not on file    Years of education: Not on file    Highest education level: Not on file   Occupational History    Not on file   Tobacco Use    Smoking status: Never    Smokeless tobacco: Never   Vaping Use    Vaping status: Never Used   Substance and Sexual Activity    Alcohol use: Not Currently    Drug use: Not on file    Sexual activity: Yes     Birth control/protection: I.U.D.   Other Topics Concern    Not on file   Social History Narrative    Not on file     Social Determinants of Health     Financial Resource Strain: Not on file   Food Insecurity: Not on file   Transportation Needs: Not on file   Physical Activity: Not on file   Stress: Not on file   Social Connections: Not on file   Intimate Partner Violence: Not on file   Housing Stability: Not on file     Family History   Problem Relation Age of Onset    No Known Problems Mother     No Known Problems Father     No Known Problems Sister     No Known Problems Daughter     Colon cancer Maternal Grandmother     No Known Problems Maternal Grandfather     No Known Problems Paternal Grandmother     No Known Problems Paternal Grandfather     Breast cancer Family     No Known Problems Son     No Known Problems Maternal Aunt     No Known  "Problems Paternal Aunt      Patient has no known allergies.  Current Outpatient Medications   Medication Sig Dispense Refill    bisacodyl (DULCOLAX) 5 mg EC tablet Take 2 tablets (10 mg total) by mouth once for 1 dose 2 tablet 0    escitalopram (LEXAPRO) 10 mg tablet Take 10 mg by mouth daily      levonorgestrel (MIRENA) 20 MCG/24HR IUD 1 each by Intrauterine route once      polyethylene glycol (GOLYTELY) 4000 mL solution Take 4,000 mL by mouth once for 1 dose 4000 mL 0     No current facility-administered medications for this visit.       Blood pressure 126/84, pulse 81, height 5' 4\" (1.626 m), weight (!) 144 kg (317 lb 3.2 oz), SpO2 99%.    PHYSICAL EXAM: Physical Exam  Constitutional:       Appearance: Normal appearance. She is well-developed.   HENT:      Head: Normocephalic and atraumatic.      Nose: Nose normal.   Eyes:      Conjunctiva/sclera: Conjunctivae normal.   Neck:      Thyroid: No thyromegaly.      Vascular: No JVD.      Trachea: No tracheal deviation.   Cardiovascular:      Rate and Rhythm: Normal rate and regular rhythm.      Heart sounds: Normal heart sounds. No murmur heard.     No friction rub. No gallop.   Pulmonary:      Effort: Pulmonary effort is normal. No respiratory distress.      Breath sounds: Normal breath sounds. No wheezing or rales.   Abdominal:      General: Bowel sounds are normal. There is no distension.      Palpations: Abdomen is soft. There is no mass.      Tenderness: There is no abdominal tenderness. There is no guarding.      Hernia: No hernia is present.   Musculoskeletal:         General: No tenderness or deformity.      Cervical back: Neck supple.      Right lower leg: No edema.      Left lower leg: No edema.   Lymphadenopathy:      Cervical: No cervical adenopathy.   Skin:     General: Skin is warm and dry.      Findings: No erythema or rash.   Neurological:      Mental Status: She is alert and oriented to person, place, and time.   Psychiatric:         Mood and Affect: " "Mood normal.         Behavior: Behavior normal.         Thought Content: Thought content normal.          No results found for: \"WBC\", \"HGB\", \"HCT\", \"MCV\", \"PLT\"  Lab Results   Component Value Date    CALCIUM 8.7 05/01/2023    K 3.7 05/01/2023    CO2 26 05/01/2023     05/01/2023    BUN 19 05/01/2023    CREATININE 0.90 05/01/2023     Lab Results   Component Value Date    ALT 13 05/01/2023    AST 12 05/01/2023    ALKPHOS 77 05/01/2023     No results found for: \"INR\", \"PROTIME\"    Mammo screening bilateral w 3d & cad    Result Date: 5/17/2024  Impression: No mammographic evidence of malignancy. ASSESSMENT/BI-RADS CATEGORY: Left: 1 - Negative Right: 1 - Negative Overall: 1 - Negative RECOMMENDATION:      - Routine screening mammogram in 1 year for both breasts. Workstation ID: [id number]           "

## 2024-07-18 NOTE — PATIENT INSTRUCTIONS
Scheduled date of colonoscopy (as of today):Sept 30, 2024  Physician performing colonoscopy: Dr. Bernal  Location of colonoscopy: [place]  Bowel prep reviewed with patient: Golytely  Instructions reviewed with patient by: LOKESH  Clearances: N/A

## 2024-07-18 NOTE — ASSESSMENT & PLAN NOTE
Patient is at increased risks for colon cancer screening because of family history of colon cancer in her grandmother and colon polyps in her mother.    -Schedule for colonoscopy.    -High-fiber diet.    -Patient was given instructions about the colonoscopy prep.    -Patient was explained about the risks and benefits of the procedure. Risks including but not limited to bleeding, infection, perforation were explained in detail. Also explained about less than 100% sensitivity with the exam and other alternatives.

## 2024-08-16 ENCOUNTER — TELEPHONE (OUTPATIENT)
Dept: GASTROENTEROLOGY | Facility: AMBULARY SURGERY CENTER | Age: 39
End: 2024-08-16

## 2024-09-30 ENCOUNTER — ANESTHESIA EVENT (OUTPATIENT)
Dept: GASTROENTEROLOGY | Facility: HOSPITAL | Age: 39
End: 2024-09-30
Payer: COMMERCIAL

## 2024-09-30 ENCOUNTER — ANESTHESIA (OUTPATIENT)
Dept: GASTROENTEROLOGY | Facility: HOSPITAL | Age: 39
End: 2024-09-30
Payer: COMMERCIAL

## 2024-09-30 ENCOUNTER — HOSPITAL ENCOUNTER (OUTPATIENT)
Dept: GASTROENTEROLOGY | Facility: HOSPITAL | Age: 39
Setting detail: OUTPATIENT SURGERY
Discharge: HOME | End: 2024-09-30
Attending: INTERNAL MEDICINE
Payer: COMMERCIAL

## 2024-09-30 VITALS
BODY MASS INDEX: 50.02 KG/M2 | SYSTOLIC BLOOD PRESSURE: 132 MMHG | TEMPERATURE: 98.5 F | HEART RATE: 51 BPM | WEIGHT: 293 LBS | RESPIRATION RATE: 18 BRPM | HEIGHT: 64 IN | OXYGEN SATURATION: 100 % | DIASTOLIC BLOOD PRESSURE: 86 MMHG

## 2024-09-30 DIAGNOSIS — Z83.719 FAMILY HISTORY OF COLONIC POLYPS: ICD-10-CM

## 2024-09-30 DIAGNOSIS — Z80.0 FAMILY HISTORY OF COLON CANCER: ICD-10-CM

## 2024-09-30 LAB
EXT PREGNANCY TEST URINE: NEGATIVE
EXT. CONTROL: NORMAL

## 2024-09-30 PROCEDURE — 81025 URINE PREGNANCY TEST: CPT | Performed by: INTERNAL MEDICINE

## 2024-09-30 PROCEDURE — 45378 DIAGNOSTIC COLONOSCOPY: CPT | Performed by: INTERNAL MEDICINE

## 2024-09-30 PROCEDURE — 93005 ELECTROCARDIOGRAM TRACING: CPT

## 2024-09-30 RX ORDER — SODIUM CHLORIDE, SODIUM LACTATE, POTASSIUM CHLORIDE, CALCIUM CHLORIDE 600; 310; 30; 20 MG/100ML; MG/100ML; MG/100ML; MG/100ML
INJECTION, SOLUTION INTRAVENOUS CONTINUOUS PRN
Status: DISCONTINUED | OUTPATIENT
Start: 2024-09-30 | End: 2024-09-30

## 2024-09-30 RX ORDER — PROPOFOL 10 MG/ML
INJECTION, EMULSION INTRAVENOUS CONTINUOUS PRN
Status: DISCONTINUED | OUTPATIENT
Start: 2024-09-30 | End: 2024-09-30

## 2024-09-30 RX ORDER — PROPOFOL 10 MG/ML
INJECTION, EMULSION INTRAVENOUS AS NEEDED
Status: DISCONTINUED | OUTPATIENT
Start: 2024-09-30 | End: 2024-09-30

## 2024-09-30 RX ADMIN — PROPOFOL 150 MG: 10 INJECTION, EMULSION INTRAVENOUS at 12:05

## 2024-09-30 RX ADMIN — PROPOFOL 150 MCG/KG/MIN: 10 INJECTION, EMULSION INTRAVENOUS at 12:05

## 2024-09-30 RX ADMIN — SODIUM CHLORIDE, SODIUM LACTATE, POTASSIUM CHLORIDE, CALCIUM CHLORIDE: 600; 310; 30; 20 INJECTION, SOLUTION INTRAVENOUS at 11:53

## 2024-09-30 NOTE — ANESTHESIA PREPROCEDURE EVALUATION
"Procedure:  COLONOSCOPY    Relevant Problems   No relevant active problems      BMI 54    No results found for: \"WBC\", \"HGB\", \"HCT\", \"MCV\", \"PLT\"  Lab Results   Component Value Date    K 3.7 05/01/2023    CO2 26 05/01/2023     05/01/2023    BUN 19 05/01/2023    CREATININE 0.90 05/01/2023       Physical Exam    Airway    Mallampati score: II  TM Distance: >3 FB  Neck ROM: full     Dental   No notable dental hx     Cardiovascular      Pulmonary      Other Findings  post-pubertal.      Anesthesia Plan  ASA Score- 3     Anesthesia Type- IV sedation with anesthesia with ASA Monitors.         Additional Monitors:     Airway Plan:            Plan Factors-Exercise tolerance (METS): >4 METS.    Chart reviewed.   Existing labs reviewed. Patient summary reviewed.                  Induction- intravenous.    Postoperative Plan-         Informed Consent- Anesthetic plan and risks discussed with patient.  I personally reviewed this patient with the CRNA. Discussed and agreed on the Anesthesia Plan with the CRNA..        "

## 2024-09-30 NOTE — H&P
"History and Physical -  [facility]  Teri Griggs 39 y.o. female MRN: [id number]        HPI: 39-year-old female with family history of colon cancer in her grandmother and family history of colon polyps in her mother.  Regular bowel movements.    Historical Information   History reviewed. No pertinent past medical history.  Past Surgical History:   Procedure Laterality Date     SECTION       and     COLONOSCOPY       Social History   Social History     Substance and Sexual Activity   Alcohol Use Not Currently    Comment: rarely     Social History     Substance and Sexual Activity   Drug Use Never     Social History     Tobacco Use   Smoking Status Never   Smokeless Tobacco Never     Family History   Problem Relation Age of Onset    No Known Problems Mother     No Known Problems Father     No Known Problems Sister     No Known Problems Daughter     No Known Problems Son     Cancer Maternal Grandmother         colon ca    Colon cancer Maternal Grandmother     Cancer Maternal Grandfather         prostate ca    No Known Problems Paternal Grandmother     No Known Problems Paternal Grandfather     No Known Problems Maternal Aunt     No Known Problems Paternal Aunt     Breast cancer Family        Meds/Allergies     Not in a hospital admission.    No Known Allergies    Objective     Blood pressure 122/92, pulse 72, temperature (!) 97.3 °F (36.3 °C), temperature source Temporal, resp. rate 16, height 5' 4\" (1.626 m), weight (!) 144 kg (318 lb), SpO2 98%.    Physical Exam:    Chest- CTA  Heart- RRR  Abdomen- NT/ND  Extremities- No edema    ASSESSMENT:     Family history of colon cancer and colon polyps    PLAN:    Colonoscopy              "

## 2024-09-30 NOTE — ANESTHESIA POSTPROCEDURE EVALUATION
Post-Op Assessment Note    CV Status:  Stable  Pain Score: 0    Pain management: adequate       Mental Status:  Sleepy and arousable   PONV Controlled:  None   Airway Patency:  Patent     Post Op Vitals Reviewed: Yes    No anethesia notable event occurred.    Staff: CRNA               BP   115/58   Temp 97   Pulse 75   Resp 14   SpO2 99

## 2024-09-30 NOTE — PERIOPERATIVE NURSING NOTE
Pt. Still with episodes of ectopy. VSS. Dr. Ahumada updated. EKG completed. Pt. Placed on 12-lead. Currently monitoring pt.

## 2024-09-30 NOTE — PERIOPERATIVE NURSING NOTE
Pt. With no ectopy noted on monitor for over 1 hour. Pt. Offers no complaints at this time. Dr. Zita stewart. MD came to see pt. Pt. Cleared for discharge per Dr. Ahumada.

## 2024-09-30 NOTE — PERIOPERATIVE NURSING NOTE
Pt. Alarming VTACH on monitor. VSS. Pt. Offers no complaints. Dr. Ahumada aware. No new orders. Will continue to monitor pt.

## 2024-10-01 LAB
ATRIAL RATE: 57 BPM
P AXIS: 41 DEGREES
PR INTERVAL: 150 MS
QRS AXIS: 32 DEGREES
QRSD INTERVAL: 70 MS
QT INTERVAL: 432 MS
QTC INTERVAL: 420 MS
T WAVE AXIS: 25 DEGREES
VENTRICULAR RATE: 57 BPM

## 2024-10-01 PROCEDURE — 93010 ELECTROCARDIOGRAM REPORT: CPT | Performed by: INTERNAL MEDICINE

## 2025-01-06 ENCOUNTER — TELEPHONE (OUTPATIENT)
Age: 40
End: 2025-01-06

## 2025-03-04 ENCOUNTER — OFFICE VISIT (OUTPATIENT)
Age: 40
End: 2025-03-04
Payer: COMMERCIAL

## 2025-03-04 VITALS
DIASTOLIC BLOOD PRESSURE: 70 MMHG | HEART RATE: 80 BPM | HEIGHT: 64 IN | SYSTOLIC BLOOD PRESSURE: 126 MMHG | RESPIRATION RATE: 16 BRPM | BODY MASS INDEX: 50.02 KG/M2 | WEIGHT: 293 LBS | TEMPERATURE: 97.6 F

## 2025-03-04 DIAGNOSIS — E66.01 MORBID (SEVERE) OBESITY DUE TO EXCESS CALORIES (HCC): Primary | ICD-10-CM

## 2025-03-04 PROCEDURE — 99244 OFF/OP CNSLTJ NEW/EST MOD 40: CPT | Performed by: PHYSICIAN ASSISTANT

## 2025-03-04 RX ORDER — TIRZEPATIDE 2.5 MG/.5ML
2.5 INJECTION, SOLUTION SUBCUTANEOUS WEEKLY
Qty: 2 ML | Refills: 0 | Status: SHIPPED | OUTPATIENT
Start: 2025-03-04 | End: 2025-04-01

## 2025-03-04 NOTE — PROGRESS NOTES
Name: Teri Griggs      : 1985      MRN: [id number]  Encounter Provider: Shobha Bee PA-C  Encounter Date: 3/4/2025   Encounter department: [facility] [place]  :  Assessment & Plan  Morbid (severe) obesity due to excess calories (HCC)    Orders:    Hemoglobin A1C; Future    Insulin, fasting; Future    Comprehensive metabolic panel; Future    Lipid panel; Future    TSH, 3rd generation with Free T4 reflex; Future    BMI 50.0-59.9, adult (HCC)    Orders:    Hemoglobin A1C; Future    Insulin, fasting; Future    Comprehensive metabolic panel; Future    Lipid panel; Future    TSH, 3rd generation with Free T4 reflex; Future      -Discussed options of HealthyCORE-Intensive Lifestyle Intervention Program, Very Low Calorie Diet-VLCD, and Conservative Program and the role of weight loss medications  -Explained the importance of making lifestyle changes if utilizing medication to aid in weight loss  -Discussed that obesity is a chronic disease and medications are typically used long term as stopped medication will increase risk of weight gain.   -Initial weight loss goal of 5-10% weight loss for improved health  -Screening labs and records reviewed from prior  - STOP BANG- 3/8  - Initial Weight: 324 lbs  - Goal Weight: 225 lbs    -Patient is interested in pursuing Conservative Program    Goals:  -Recommend Referral to Registered Dietitian  -Recommend reduced Calorie diet, meal plan provided. Suggest Calorie tracking Terrence such as Lose It terrence or MyFitnessPal.   -To drink at least 64-80oz of water daily. No sugary beverages.  -Recommend working up to at least 150 minutes of exercise per week including full body strength training 2x per week     Return Visit: 1 month  1)  RX Zepbound 2.5mg x 4 weeks and then increase to 5mg once weekly. Emphasized the importance of adherence to the medication schedule and lifestyle modifications, including diet and exercise. Provided patient with written  materials on Zepbound usage, dietary plans, and exercise recommendations. Discussed the importance of regular monitoring and follow up to ensure the safety and effectiveness of the treatment.  Education provided on side effects, how to monitor and when to see medical attention.  Patient received training on self-administration of the injection. Goal of treatment is to attain a weight loss of approximately 5-10% TBW within next 3-6 months.  Goal is to achieve weight loss to improve overall health and reduce risks associated with obesity and weight related comorbidities.      Teri has tried more than 6 months of lifestyle modifications to include diet and activity changes and has had insignificant weight loss of less than 1 lb per week.  Patient denies personal and family history of MCT and MEN2 tumors. Patient denies personal history of pancreatitis.  Side effects discussed but not limited to diarrhea, bloating, constipation, nausea, GI upset, heartburn, fatigue.  Titration and medication administration discussed. Medication agreement signed. Patient has IUD as contraception method.     I have sent Zebound to your pharmacy. The prior authorization process will been done through our prior authorization team and can take up to 3-4 weeks to process through the insurance.     - Start Zepbound 2.5 mg subcutaneously weekly. After you have taken the second pen, please give me an update, as we will likely increase the dose the next month if you are tolerating it well.    - Side effects of Zepbound include nausea, vomiting, diarrhea, or constipation. Keep an eye on your heart rate while on Zepbound. If you resting heart rate is greater than 100 beats per minutes, please notify me. If you develop severe abdominal pain, stop Zepbound and go to the emergency room, as that could be a sign of pancreatitis.     - Please notify me if you have surgery, upper endoscopy, or colonoscopy scheduled, as we typically hold Zepbound for  "one week prior to the procedure.     - Zepbound can reduce the effectiveness of oral hormonal birth control (birth control pills). Recommend a barrier backup method such as condoms to prevent pregnancy.       2) Fasting labs- pending     3) Referral to dietitian for meal planning visit   Nutrition RX:  - start tracking intake  - focus on protein- goal is 30 grams per meal; 90 grams per day  - focus on increasing fiber first by increasing vegetable servings per day  - do not skip breakfast and goal water intake 64 oz daily    Physical Activity RX:  - Goal is 150-200 mins of activity weekly.  Try to include at least 2 strength training sessions; increasing lean body mass will really help you to lose weight and maintain weight loss.      History of Present Illness   HPI    Waist circumference 49.5\" (3/4/25)   Neck 15.5\"  STOP-BANG 3/8  Goal weight 225 lbs     Teri Griggs is a 39 y.o. female who presents for weight management initial consultation.   History obtained from: patient    Weight History:   Patient reports struggling with weight since childhood.   She lost her dad last year was very stressful and gained more weight at this time.  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     Weight watchers- started when she was 10 yo. She has worked with nutritionist several times as well over the years.      VALUE- wants to live a long time, has 2 kids, wants to be active and healthy for her family.     Feels tired but otherwise no other reported issues.  She did join a gym [employer]; lost weight since starting there 338 lbs to 324 lbs. She is going 2-3 times per week in personal training (>2 hrs of physical activity per week).  She is working on increasing her physical activity. She has been doing Whole[employer].    Diet Recall:  B- eggs, peppers, onions  L- protein, veggie, baked potato   D- tries to eat with family, chicken, tacos (shrimp, " chicken)  Water- 80 oz on gym days, 40 oz otherwise   Does not like dairy     Not currently tracking calories, working on staying away from processed foods.     Family History- HTN and T2DM.  Moms side of family struggles with HTN, weight, colon cancer, T2DM.                                        Review of Systems  Pertinent Medical History   Colonoscopy- 2024   Holter monitor 2024- within normal limits; met with cardiology         Current Outpatient Medications on File Prior to Visit   Medication Sig Dispense Refill    escitalopram (LEXAPRO) 10 mg tablet Take 10 mg by mouth daily      levonorgestrel (MIRENA) 20 MCG/24HR IUD 1 each by Intrauterine route once       No current facility-administered medications on file prior to visit.      Social History     Tobacco Use    Smoking status: Never    Smokeless tobacco: Never   Vaping Use    Vaping status: Never Used   Substance and Sexual Activity    Alcohol use: Not Currently     Comment: rarely    Drug use: Never    Sexual activity: Yes     Birth control/protection: I.U.D.        Objective   There were no vitals taken for this visit.     Physical Exam  Vitals and nursing note reviewed.   Constitutional:       General: She is not in acute distress.     Appearance: She is well-developed.   HENT:      Head: Normocephalic and atraumatic.   Eyes:      Conjunctiva/sclera: Conjunctivae normal.   Cardiovascular:      Rate and Rhythm: Normal rate and regular rhythm.      Heart sounds: No murmur heard.  Pulmonary:      Effort: Pulmonary effort is normal. No respiratory distress.      Breath sounds: Normal breath sounds.   Abdominal:      Palpations: Abdomen is soft.      Tenderness: There is no abdominal tenderness.   Musculoskeletal:         General: No swelling.      Cervical back: Neck supple.   Skin:     General: Skin is warm and dry.      Capillary Refill: Capillary refill takes less than 2 seconds.   Neurological:      Mental Status: She is alert.   Psychiatric:          Mood and Affect: Mood normal.       Administrative Statements   I have spent a total time of 45 minutes in caring for this patient on the day of the visit/encounter including Instructions for management.

## 2025-03-04 NOTE — PATIENT INSTRUCTIONS
I have sent Zebound to your pharmacy. The prior authorization process will been done through our prior authorization team and can take up to 3-4 weeks to process through the insurance.     - Start Zepbound 2.5 mg subcutaneously weekly. After you have taken the second pen, please give me an update, as we will likely increase the dose the next month if you are tolerating it well.    - Side effects of Zepbound include nausea, vomiting, diarrhea, or constipation. Keep an eye on your heart rate while on Zepbound. If you resting heart rate is greater than 100 beats per minutes, please notify me. If you develop severe abdominal pain, stop Zepbound and go to the emergency room, as that could be a sign of pancreatitis.     - Please notify me if you have surgery, upper endoscopy, or colonoscopy scheduled, as we typically hold Zepbound for one week prior to the procedure.     - Zepbound can reduce the effectiveness of oral hormonal birth control (birth control pills). Recommend a barrier backup method such as condoms to prevent pregnancy.       **Please let me know via [employer] when you have taken your 2nd dose of Zepbound and how you are doing on it overall, at that time I will send in a refill for you at the 5mg dose if appropriate.     GLP-1 Managing Side Effects Tips:  - focus on small frequent meals  - moderate sugar and fat intake  - change the injection site (abdomen --> thigh--> back of arm)  - eat protein, crackers, mints and tala-based drinks  - nighttime injections  - drink plenty of water  - consider fiber supplements like miralax    Tips for Nausea:  - Don't drink and eat simultaneously: separate fluids 30-60 minutes before and after meals when experiencing nausea or if you notice you become full quickly  - Choose mild smelling foods- strong smells can exacerbate nausea  - Tala and peppermint- consider drinking tala or peppermint tea, which can help alleviate symptoms  - Eat- don't skip meals, if you have  nausea, it is understandable that you may not feel like eating. However, skipping meals is generally not recommended as this can lead to lower blood sugar and fatigue. Furthermore, it is essential to consume adequate protein during weight loss. You can opt for a protein shake, a meal replacement supplement, bone broth or soup.     Tips for Constipation:   - Drink plenty of water  - Eat food with a high fiber content: increase your fiber intake by consuming these types of foods:   Eat more whole grain products like barley, oats, farro, brown or wild rice and quinoa.    Look for choices with 100% whole wheat, rye, oats or bran as the first ingredient listed    Check nutrition fact labels and choose products with 4gm of dietary fiber or more per serving   Add more beans to your diet   Eat more fresh fruits and vegetables with skins on them    Exercise- increase physical activity as it helps stimulate gastric mobility, which moves stool through the digestive tract     Nutrition RX:  - start tracking intake  - focus on protein- goal is 30 grams per meal; 90 grams per day  - focus on increasing fiber first by increasing vegetable servings per day  - do not skip breakfast and goal water intake 64 oz daily    Physical Activity RX:  - Goal is 150-200 mins of activity weekly.  Try to include at least 2 strength training sessions; increasing lean body mass will really help you to lose weight and maintain weight loss.

## 2025-03-05 ENCOUNTER — TELEPHONE (OUTPATIENT)
Age: 40
End: 2025-03-05

## 2025-03-05 ENCOUNTER — APPOINTMENT (OUTPATIENT)
Dept: LAB | Facility: CLINIC | Age: 40
End: 2025-03-05
Payer: COMMERCIAL

## 2025-03-05 ENCOUNTER — RESULTS FOLLOW-UP (OUTPATIENT)
Age: 40
End: 2025-03-05

## 2025-03-05 ENCOUNTER — ANNUAL EXAM (OUTPATIENT)
Dept: OBGYN CLINIC | Facility: CLINIC | Age: 40
End: 2025-03-05

## 2025-03-05 VITALS
HEART RATE: 66 BPM | DIASTOLIC BLOOD PRESSURE: 81 MMHG | WEIGHT: 293 LBS | SYSTOLIC BLOOD PRESSURE: 123 MMHG | HEIGHT: 60 IN | BODY MASS INDEX: 57.52 KG/M2 | RESPIRATION RATE: 18 BRPM

## 2025-03-05 DIAGNOSIS — Z12.31 ENCOUNTER FOR SCREENING MAMMOGRAM FOR MALIGNANT NEOPLASM OF BREAST: ICD-10-CM

## 2025-03-05 DIAGNOSIS — E66.01 MORBID (SEVERE) OBESITY DUE TO EXCESS CALORIES (HCC): ICD-10-CM

## 2025-03-05 DIAGNOSIS — Z01.419 ENCOUNTER FOR ANNUAL ROUTINE GYNECOLOGICAL EXAMINATION: Primary | ICD-10-CM

## 2025-03-05 LAB
ALBUMIN SERPL BCG-MCNC: 3.8 G/DL (ref 3.5–5)
ALP SERPL-CCNC: 64 U/L (ref 34–104)
ALT SERPL W P-5'-P-CCNC: 12 U/L (ref 7–52)
ANION GAP SERPL CALCULATED.3IONS-SCNC: 6 MMOL/L (ref 4–13)
AST SERPL W P-5'-P-CCNC: 13 U/L (ref 13–39)
BILIRUB SERPL-MCNC: 0.35 MG/DL (ref 0.2–1)
BUN SERPL-MCNC: 16 MG/DL (ref 5–25)
CALCIUM SERPL-MCNC: 8.8 MG/DL (ref 8.4–10.2)
CHLORIDE SERPL-SCNC: 106 MMOL/L (ref 96–108)
CHOLEST SERPL-MCNC: 148 MG/DL (ref ?–200)
CO2 SERPL-SCNC: 28 MMOL/L (ref 21–32)
CREAT SERPL-MCNC: 0.78 MG/DL (ref 0.6–1.3)
EST. AVERAGE GLUCOSE BLD GHB EST-MCNC: 117 MG/DL
GFR SERPL CREATININE-BSD FRML MDRD: 95 ML/MIN/1.73SQ M
GLUCOSE P FAST SERPL-MCNC: 98 MG/DL (ref 65–99)
HBA1C MFR BLD: 5.7 %
HDLC SERPL-MCNC: 37 MG/DL
INSULIN SERPL-ACNC: 12.96 UIU/ML (ref 1.9–23)
LDLC SERPL CALC-MCNC: 90 MG/DL (ref 0–100)
NONHDLC SERPL-MCNC: 111 MG/DL
POTASSIUM SERPL-SCNC: 4.9 MMOL/L (ref 3.5–5.3)
PROT SERPL-MCNC: 6.5 G/DL (ref 6.4–8.4)
SODIUM SERPL-SCNC: 140 MMOL/L (ref 135–147)
TRIGL SERPL-MCNC: 103 MG/DL (ref ?–150)
TSH SERPL DL<=0.05 MIU/L-ACNC: 2.88 UIU/ML (ref 0.45–4.5)

## 2025-03-05 PROCEDURE — 84443 ASSAY THYROID STIM HORMONE: CPT

## 2025-03-05 PROCEDURE — 36415 COLL VENOUS BLD VENIPUNCTURE: CPT

## 2025-03-05 PROCEDURE — 80061 LIPID PANEL: CPT

## 2025-03-05 PROCEDURE — 83036 HEMOGLOBIN GLYCOSYLATED A1C: CPT

## 2025-03-05 PROCEDURE — 83525 ASSAY OF INSULIN: CPT

## 2025-03-05 PROCEDURE — 80053 COMPREHEN METABOLIC PANEL: CPT

## 2025-03-05 PROCEDURE — S0612 ANNUAL GYNECOLOGICAL EXAMINA: HCPCS | Performed by: OBSTETRICS & GYNECOLOGY

## 2025-03-05 NOTE — PROGRESS NOTES
Name: Teri Griggs      : 1985      MRN: [id number]  Encounter Provider: Bora Fields MD  Encounter Date: 3/5/2025   Encounter department: [facility] [place]  :  Assessment & Plan      Normal breast and GYN exam  Normal mammogram May 2020  IUD placed 2019  Normal Pap smear negative HPV 3/23          Plan: Rx mammogram.  Recommend healthy diet and exercise.  Will be starting weight loss medicine today         Teri Griggs is a 39 y.o. female who presents for annual exam with no GYN complaints.  Has an IUD and has been extremely happy.  Denies any abnormal bleeding pelvic pain or dyspareunia.  Denies any breast bowel or bladder issues.  Saw a weight loss management and was given a prescription for loss medicine.  Will be starting today.  Also joined a gym and has been exercising several times a week.  No change in family history.  Medications reviewed.  Working full-time from home as a .  Children are doing well ages 8 and 10.       Objective   /81 (BP Location: Right arm, Patient Position: Sitting, Cuff Size: Extra-Large)   Pulse 66   Resp 18   Ht 5' (1.524 m)   Wt (!) 146 kg (322 lb)   LMP  (LMP Unknown)   BMI 62.89 kg/m²      Physical Exam  Vitals and nursing note reviewed.   Constitutional:       General: She is not in acute distress.     Appearance: She is well-developed.   HENT:      Head: Normocephalic and atraumatic.   Eyes:      Conjunctiva/sclera: Conjunctivae normal.   Cardiovascular:      Rate and Rhythm: Normal rate and regular rhythm.      Heart sounds: No murmur heard.  Pulmonary:      Effort: Pulmonary effort is normal. No respiratory distress.      Breath sounds: Normal breath sounds.   Chest:   Breasts:     Right: Normal.      Left: Normal.   Abdominal:      Palpations: Abdomen is soft.      Tenderness: There is no abdominal tenderness.   Genitourinary:     Vagina: Normal.      Cervix: Normal.       Uterus: Normal.       Adnexa: Right adnexa normal.      Comments: External genitalia normal.  No cystocele uterine prolapse or rectocele.  Musculoskeletal:         General: No swelling.      Cervical back: Neck supple.   Skin:     General: Skin is warm and dry.      Capillary Refill: Capillary refill takes less than 2 seconds.   Neurological:      Mental Status: She is alert.   Psychiatric:         Mood and Affect: Mood normal.

## 2025-03-05 NOTE — TELEPHONE ENCOUNTER
PA for Zepbound SUBMITTED to [employer]    via    [x]CMM-KEY: [id number]  []Surescripts-Case ID #    []Availity-Auth ID #  NDC #    []Faxed to plan   []Other website    []Phone call Case ID #      []PA sent as URGENT    All office notes, labs and other pertaining documents and studies sent. Clinical questions answered. Awaiting determination from insurance company.     Turnaround time for your insurance to make a decision on your Prior Authorization can take 7-21 business days.

## 2025-03-05 NOTE — TELEPHONE ENCOUNTER
PA for Zepbound  APPROVED     Date(s) approved 3/5/2025-9/5/2025    Case #    Patient advised by          []Kobohart Message  [x]Phone call   []LMOM  []L/M to call office as no active Communication consent on file  []Unable to leave detailed message as VM not approved on Communication consent       Pharmacy advised by    [x]Fax  []Phone call  []Secure Chat    Specialty Pharmacy    []     Approval letter scanned into Media Yes

## 2025-03-26 ENCOUNTER — TELEPHONE (OUTPATIENT)
Age: 40
End: 2025-03-26

## 2025-03-26 NOTE — TELEPHONE ENCOUNTER
Patient called in because she wasn't sure what the process is for requesting a refill on her weight loss med and if she should be increased or not.     She has an appointment scheduled 4/21/25.    Reason for call:   [x] Refill   [] Prior Auth  [] Other:     Office:   [] PCP/Provider -   [x] Specialty/Provider - Weight management     Medication: Zepbound     Quantity: 2 mL     Pharmacy:   [id number] -   [place], [place] - [address]   [phone]     Local Pharmacy   Does the patient have enough for 3 days?   [x] Yes   [] No - Send as HP to POD    Mail Away Pharmacy   Does the patient have enough for 10 days?   [] Yes   [] No - Send as HP to POD    How are you tolerating the medication?   [] Nausea  [] Vomiting  [] Diarrhea  [x] Asymptomatic  [] Other:    Last visit weight: 324 lbs     Current weight: 314 lbs     Date of last injection: Today     How many injections do you have left: None

## 2025-03-27 DIAGNOSIS — E66.01 MORBID (SEVERE) OBESITY DUE TO EXCESS CALORIES (HCC): Primary | ICD-10-CM

## 2025-03-27 RX ORDER — TIRZEPATIDE 5 MG/.5ML
5 INJECTION, SOLUTION SUBCUTANEOUS WEEKLY
Qty: 2 ML | Refills: 2 | Status: SHIPPED | OUTPATIENT
Start: 2025-03-27 | End: 2025-06-19

## 2025-04-17 NOTE — PROGRESS NOTES
Weight Management Medical Nutrition Assessment  Teri presented for a meal planning session. Today's weight is 311# loss of 13# overall of 4.2%TBW .  Hx of  recent Prediabetes( HgA1c 5.7%) and elevated fasting insulin.  Taking Zepbound no adverse side effects noted and enjoying less food noise.    Per dietary recall patient consumes excess calories from carbohydrates and a lack of overall protein and fiber .  Educated using the myplate method and would like to increase overall protein. Working on staying away from processed foods. Started going to the gym 2-3 times a week and .Declined meal plan at this time and looking to focus more on goal setting. Not currently interested in tracking calories. Goals established accordingly. Hydration not at goal will work on increasing. Will follow up.   Goals:  1) Eat lunch with at least 30 grams of protein  2) Try to eat at least 1-2 servings vegetables at dinner time     Patient seen by Medical Provider in past 6 months:  yes  Requested to schedule appointment with Medical Provider: No      Anthropometric Measurements  Start Weight (#) & Date: 324.4# 03/04/2025  Current Weight (#): 311#  TBW % Change from start weight:4.2%  Ideal Body Weight (#):120#  64 inches  Goal Weight (#):225#  Highest: 338#  Lowest: 250#    Weight Loss History  Previous weight loss attempts: Commercial Programs (Weight Watchers, [employer], etc.)  Nutrition Counseling with RD    Food and Nutrition Related History  Wake up: 6:30-7am   Bed Time:10 am    Food Recall- not snacking lately  Breakfast:9-10 am :2-3eggs, peppers, onions + guacamole cups + 1 Sourdough bread piece    Snack:skip  Lunch: 2-3 pm bagged Salads or Leftovers (protein, veggie, baked potato ) eating 3 days a week  Snack:skip  Dinner:7-8 pm: mely to eat with family, chicken, tacos (shrimp, chicken)  Snack:skip  Diet Recall:  B- eggs, peppers, onions  L- protein, veggie, baked potato   D- tries to eat with family, chicken, tacos (shrimp,  chicken)  Water- 80 oz on gym days, 40 oz otherwise   Does not like dairy    Beverages: water, iced coffee + nondairy creamer(120 calories) 1-2 a day.   Volume of beverage intake: 40 oz water 80 oz when going to the gym    Weekends: Same  Cravings: n/a  Trouble area of day:Dinner    Frequency of Eating out: Once a week  Food restrictions:n/a  Cooking: self   Food Shopping: self    Physical Activity Intake  Activity:Gym strength training and cardio  Frequency: 2-3 days a week for 1 hr  Physical limitations/barriers to exercise: n/a    Estimated Needs  Energy  SECA: BMR:n/a      X 1.3 -1000 =  [address] [place] [address] [employer] Energy Needs: BMR : 2061   1-2# loss weekly sedentary:  8575-3028             1-2# loss weekly lightly active:5058-7190  Maintenance calories for sedentary activity level: 2473  Protein:65-82 grams      (1.2-1.5g/kg IBW)  Fluid Requirement Calculator   Total Fluid: 133   oz  ([place]-Segar Method)  Free Fluid: 106 oz ([place]-Segar Method - 20%)    Nutrition Diagnosis  Yes;    Overweight/obesity  related to Excess energy intake as evidenced by  BMI more than normative standard for age and sex (obesity-grade III 40+)       Nutrition Intervention    Nutrition Prescription  Calories:1600  Protein:82 grams  Fluid:106 oz     Meal Plan (Shay/Pro/Carb)-Declined at this time would like to focus on goal setting  Breakfast:  Snack:  Lunch:  Snack:  Dinner:  Snack:    Nutrition Education:    Calorie controlled menu  Lean protein food choices  Healthy snack options  Food journaling tips      Nutrition Counseling:  Strategies: meal planning, portion sizes, healthy snack choices, hydration, fiber intake, protein intake, exercise, food journal      Monitoring and Evaluation:  Evaluation criteria:  Energy Intake  Meet protein needs  Maintain adequate hydration  Monitor weekly weight  Meal planning/preparation  Food journal   Decreased portions at mealtimes and snacks  Physical activity     Barriers to learning:none  Readiness  to change: Action:  (Changing behavior)  Comprehension: excellent  Expected Compliance: excellent

## 2025-04-21 ENCOUNTER — CLINICAL SUPPORT (OUTPATIENT)
Age: 40
End: 2025-04-21

## 2025-04-21 ENCOUNTER — OFFICE VISIT (OUTPATIENT)
Age: 40
End: 2025-04-21
Payer: COMMERCIAL

## 2025-04-21 VITALS
BODY MASS INDEX: 50.02 KG/M2 | HEART RATE: 78 BPM | TEMPERATURE: 97.7 F | RESPIRATION RATE: 16 BRPM | DIASTOLIC BLOOD PRESSURE: 70 MMHG | SYSTOLIC BLOOD PRESSURE: 118 MMHG | HEIGHT: 64 IN | WEIGHT: 293 LBS

## 2025-04-21 VITALS — BODY MASS INDEX: 50.02 KG/M2 | WEIGHT: 293 LBS | HEIGHT: 64 IN

## 2025-04-21 DIAGNOSIS — R63.5 ABNORMAL WEIGHT GAIN: Primary | ICD-10-CM

## 2025-04-21 DIAGNOSIS — E66.01 MORBID (SEVERE) OBESITY DUE TO EXCESS CALORIES (HCC): Primary | ICD-10-CM

## 2025-04-21 PROCEDURE — 99214 OFFICE O/P EST MOD 30 MIN: CPT | Performed by: PHYSICIAN ASSISTANT

## 2025-04-21 PROCEDURE — RECHECK

## 2025-04-21 PROCEDURE — WMDI30

## 2025-04-21 RX ORDER — TIRZEPATIDE 7.5 MG/.5ML
7.5 INJECTION, SOLUTION SUBCUTANEOUS WEEKLY
Qty: 2 ML | Refills: 1 | Status: SHIPPED | OUTPATIENT
Start: 2025-04-21 | End: 2025-06-16

## 2025-04-21 NOTE — PROGRESS NOTES
Name: Teri Griggs      : 1985      MRN: [id number]  Encounter Provider: Shobha Bee PA-C  Encounter Date: 2025   Encounter department: [facility] [place]  :  Assessment & Plan  Morbid (severe) obesity due to excess calories (HCC)    Orders:    tirzepatide (Zepbound) 7.5 mg/0.5 mL auto-injector; Inject 0.5 mL (7.5 mg total) under the skin once a week    BMI 50.0-59.9, adult (HCC)    Orders:    tirzepatide (Zepbound) 7.5 mg/0.5 mL auto-injector; Inject 0.5 mL (7.5 mg total) under the skin once a week      -Discussed options of HealthyCORE-Intensive Lifestyle Intervention Program, Very Low Calorie Diet-VLCD, and Conservative Program and the role of weight loss medications  -Explained the importance of making lifestyle changes if utilizing medication to aid in weight loss  -Discussed that obesity is a chronic disease and medications are typically used long term as stopped medication will increase risk of weight gain.   -Initial weight loss goal of 5-10% weight loss for improved health  -Screening labs and records reviewed from prior  - STOP BANG- 3/8  - Initial Weight: 324 lbs  - Goal Weight: 225 lbs    -Patient is interested in pursuing Conservative Program    Goals:  -Recommend Referral to Registered Dietitian  -Recommend reduced Calorie diet, meal plan provided. Suggest Calorie tracking Terrence such as Lose It terrence or [employer].   -To drink at least 64-80oz of water daily. No sugary beverages.  -Recommend working up to at least 150 minutes of exercise per week including full body strength training 2x per week     Return Visit: 1 month  1) Continue Zepbound- increase to 7.5mg subcutaneously weekly.     - Side effects of Zepbound include nausea, vomiting, diarrhea, or constipation. Keep an eye on your heart rate while on Zepbound. If you resting heart rate is greater than 100 beats per minutes, please notify me. If you develop severe abdominal pain, stop Zepbound and go  "to the emergency room, as that could be a sign of pancreatitis.     - Please notify me if you have surgery, upper endoscopy, or colonoscopy scheduled, as we typically hold Zepbound for one week prior to the procedure.     - Zepbound can reduce the effectiveness of oral hormonal birth control (birth control pills). Recommend a barrier backup method such as condoms to prevent pregnancy.       2) Continue working with dietitian team; focus on protein and fiber goals     3) Referral to dietitian for meal planning visit   Nutrition RX:  - start tracking intake  - focus on protein- goal is 30 grams per meal; 90 grams per day  - focus on increasing fiber first by increasing vegetable servings per day  - do not skip breakfast and goal water intake 64 oz daily    Physical Activity RX:  - Goal is 150-200 mins of activity weekly.  Try to include at least 2 strength training sessions; increasing lean body mass will really help you to lose weight and maintain weight loss.      History of Present Illness   HPI    Waist circumference 49.5\" (3/4/25)   Waist circumference 49\" (4/20/25)  Neck 15.5\"  STOP-BANG 3/8  Goal weight 225 lbs     Teri Griggs is a 40 y.o. female who presents for weight management initial consultation.   History obtained from: patient    Patient presents for follow up today.  She has been on Zepbound 5mg weekly, no reported side effects. Appetite is suppressed, she does not feel a desire to overeat, was in good control over Easter which is a huge win for her.     Physical activity:  2-3 days per week at the gym for strength training and then walking on off days     Nutrition:  B- eggs, veggie, slice of bread   L- typically skips   D- protein, veggies, baked potato    Weight History:   Patient reports struggling with weight since childhood.   She lost her dad last year was very stressful and gained more weight at this time.  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     Weight watchers- started when she was 10 yo. She has worked with nutritionist several times as well over the years.      VALUE- wants to live a long time, has 2 kids, wants to be active and healthy for her family.     Feels tired but otherwise no other reported issues.  She did join a gym [employer]; lost weight since starting there 338 lbs to 324 lbs. She is going 2-3 times per week in personal training (>2 hrs of physical activity per week).  She is working on increasing her physical activity. She has been doing Whole30.    Diet Recall:  B- eggs, peppers, onions  L- protein, veggie, baked potato   D- tries to eat with family, chicken, tacos (shrimp, chicken)  Water- 80 oz on gym days, 40 oz otherwise   Does not like dairy     Not currently tracking calories, working on staying away from processed foods.     Family History- HTN and T2DM.  Moms side of family struggles with HTN, weight, colon cancer, T2DM.                                        Review of Systems  Pertinent Medical History   Colonoscopy- 2024   Holter monitor 2024- within normal limits; met with cardiology         Current Outpatient Medications on File Prior to Visit   Medication Sig Dispense Refill    escitalopram (LEXAPRO) 10 mg tablet Take 10 mg by mouth daily      levonorgestrel (MIRENA) 20 MCG/24HR IUD 1 each by Intrauterine route once      tirzepatide (Zepbound) 5 mg/0.5 mL auto-injector Inject 0.5 mL (5 mg total) under the skin once a week 2 mL 2     No current facility-administered medications on file prior to visit.      Social History     Tobacco Use    Smoking status: Never    Smokeless tobacco: Never   Vaping Use    Vaping status: Never Used   Substance and Sexual Activity    Alcohol use: Yes     Comment: rarely    Drug use: Never    Sexual activity: Yes     Partners: Male     Birth control/protection: I.U.D.        Objective   /70 (BP Location: Left arm, Patient Position: Sitting)    "Pulse 78   Temp 97.7 °F (36.5 °C) (Tympanic)   Resp 16   Ht 5' 4\" (1.626 m)   Wt (!) 141 kg (311 lb)   BMI 53.38 kg/m²      Physical Exam  Vitals and nursing note reviewed.   Constitutional:       General: She is not in acute distress.     Appearance: She is well-developed.   HENT:      Head: Normocephalic and atraumatic.   Eyes:      Conjunctiva/sclera: Conjunctivae normal.   Cardiovascular:      Rate and Rhythm: Normal rate and regular rhythm.      Heart sounds: No murmur heard.  Pulmonary:      Effort: Pulmonary effort is normal. No respiratory distress.      Breath sounds: Normal breath sounds.   Abdominal:      Palpations: Abdomen is soft.      Tenderness: There is no abdominal tenderness.   Musculoskeletal:         General: No swelling.      Cervical back: Neck supple.   Skin:     General: Skin is warm and dry.      Capillary Refill: Capillary refill takes less than 2 seconds.   Neurological:      Mental Status: She is alert.   Psychiatric:         Mood and Affect: Mood normal.       Administrative Statements   I have spent a total time of 45 minutes in caring for this patient on the day of the visit/encounter including Instructions for management.  "

## 2025-04-21 NOTE — PATIENT INSTRUCTIONS
GLP-1 Managing Side Effects Tips:  - focus on small frequent meals  - moderate sugar and fat intake  - change the injection site (abdomen --> thigh--> back of arm)  - eat protein, crackers, mints and tala-based drinks  - nighttime injections  - drink plenty of water  - consider fiber supplements like miralax    Tips for Nausea:  - Don't drink and eat simultaneously: separate fluids 30-60 minutes before and after meals when experiencing nausea or if you notice you become full quickly  - Choose mild smelling foods- strong smells can exacerbate nausea  - Tala and peppermint- consider drinking tala or peppermint tea, which can help alleviate symptoms  - Eat- don't skip meals, if you have nausea, it is understandable that you may not feel like eating. However, skipping meals is generally not recommended as this can lead to lower blood sugar and fatigue. Furthermore, it is essential to consume adequate protein during weight loss. You can opt for a protein shake, a meal replacement supplement, bone broth or soup.     Tips for Constipation:   - Drink plenty of water  - Eat food with a high fiber content: increase your fiber intake by consuming these types of foods:   Eat more whole grain products like barley, oats, farro, brown or wild rice and quinoa.    Look for choices with 100% whole wheat, rye, oats or bran as the first ingredient listed    Check nutrition fact labels and choose products with 4gm of dietary fiber or more per serving   Add more beans to your diet   Eat more fresh fruits and vegetables with skins on them    Exercise- increase physical activity as it helps stimulate gastric mobility, which moves stool through the digestive tract

## 2025-05-30 ENCOUNTER — TELEPHONE (OUTPATIENT)
Age: 40
End: 2025-05-30

## 2025-05-30 NOTE — TELEPHONE ENCOUNTER
Called pt to let the pt know that the provider has sent over the refills for the medication and I rescheduled the appt due to the pt going on vacation, I let the provider know about us moving up the appt and provider and pt was fine.

## 2025-06-02 NOTE — PROGRESS NOTES
Weight Management Medical Nutrition Assessment  Teri presented for a 1/3 bundle meal planning session. Today's weight is 296.8# loss of 14# since last visit overall of 8.6%TBW .  Hx of  recent Prediabetes( HgA1c 5.7%) and elevated fasting insulin.  Taking Zepbound no adverse side effects noted and enjoying less food noise.    Per dietary recall patient consumes excess calories from carbohydrates and a lack of overall protein and fiber .  Educated using the myplate method and would like to increase overall protein. Working on staying away from processed foods. Started going to the gym 3 times a week and .Declined meal plan at this time and looking to focus more on goal setting. Started keeping track of calories past few days. Would like to keep calories below 1900 calories Goals established accordingly. Hydration not at goal will work on increasing. Will follow up.   Goals:  1) Eat lunch with at least 30 grams of protein  2) Try to eat at least 1-2 servings vegetables a day  3) Track overall calories     Patient seen by Medical Provider in past 6 months:  yes  Requested to schedule appointment with Medical Provider: No      Anthropometric Measurements  Start Weight (#) & Date: 324.4# 03/04/2025  Current Weight (#): 296.8#   TBW % Change from start weight:8.6%  Ideal Body Weight (#):120#  64 inches  Goal Weight (#):225#  Highest: 338#  Lowest: 250#    Weight Loss History  Previous weight loss attempts: Commercial Programs (Weight Watchers, [facility], etc.)  Nutrition Counseling with RD    Food and Nutrition Related History  Wake up: 6:30-7am   Bed Time:10 am    Food Recall- not snacking lately  Breakfast:9-10 am :2 egg+  guacamole cups + [employer] killer bread x2   + protein powder iced coffee    Snack:skip  Lunch: 2-3 pm bagged Salads or Leftovers (protein, veggie, baked potato ) eating 3 days a week  Snack:skip  Dinner:7-8 pm: mely to eat with family, chicken, tacos (shrimp, chicken)  Snack:skip  Diet Recall:  B-  eggs, peppers, onions  L- protein, veggie, baked potato   D- tries to eat with family, chicken, tacos (shrimp, chicken)  Water- 80 oz on gym days, 40 oz otherwise   Does not like dairy    Beverages: water, iced coffee + nondairy creamer(120 calories) 1-2 a day.   Volume of beverage intake: 40 oz water /80 oz when going to the gym    Weekends: Same  Cravings: n/a  Trouble area of day:Dinner    Frequency of Eating out: Once a week  Food restrictions:n/a  Cooking: self   Food Shopping: self    Physical Activity Intake  Activity:Gym strength training and cardio  Frequency:3 days a week for 1 hr  Physical limitations/barriers to exercise: n/a    Estimated Needs  Energy  SECA: BMR:n/a      X 1.3 -1000 =  [address] [employer] Needs: BMR : 1996   1-2# loss weekly sedentary:  0212-4487             1-2# loss weekly lightly active:5264-3871  Maintenance calories for sedentary activity level: 2396  Protein:65-82 grams      (1.2-1.5g/kg IBW)  Fluid Requirement Calculator   Total Fluid: 133   oz  (Marian-Segar Method)  Free Fluid: 106 oz (Marian-Segar Method - 20%)    Nutrition Diagnosis  Yes;    Overweight/obesity  related to Excess energy intake as evidenced by  BMI more than normative standard for age and sex (obesity-grade III 40+)       Nutrition Intervention    Nutrition Prescription  Calories:1600  Protein:82 grams  Fluid:106 oz     Meal Plan (Shay/Pro/Carb)-Declined at this time would like to focus on goal setting  Breakfast:  Snack:  Lunch:  Snack:  Dinner:  Snack:    Nutrition Education:    Calorie controlled menu  Lean protein food choices  Healthy snack options  Food journaling tips      Nutrition Counseling:  Strategies: meal planning, portion sizes, healthy snack choices, hydration, fiber intake, protein intake, exercise, food journal      Monitoring and Evaluation:  Evaluation criteria:  Energy Intake  Meet protein needs  Maintain adequate hydration  Monitor weekly weight  Meal planning/preparation  Food  journal   Decreased portions at mealtimes and snacks  Physical activity     Barriers to learning:none  Readiness to change: Action:  (Changing behavior)  Comprehension: excellent  Expected Compliance: excellent

## 2025-06-05 ENCOUNTER — CLINICAL SUPPORT (OUTPATIENT)
Age: 40
End: 2025-06-05

## 2025-06-05 VITALS — HEIGHT: 64 IN | BODY MASS INDEX: 50.02 KG/M2 | WEIGHT: 293 LBS

## 2025-06-05 DIAGNOSIS — R63.5 ABNORMAL WEIGHT GAIN: Primary | ICD-10-CM

## 2025-06-05 PROCEDURE — RECHECK

## 2025-06-05 PROCEDURE — DB3PK

## 2025-06-16 ENCOUNTER — OFFICE VISIT (OUTPATIENT)
Age: 40
End: 2025-06-16
Payer: COMMERCIAL

## 2025-06-16 VITALS
HEART RATE: 86 BPM | SYSTOLIC BLOOD PRESSURE: 120 MMHG | DIASTOLIC BLOOD PRESSURE: 70 MMHG | HEIGHT: 64 IN | RESPIRATION RATE: 16 BRPM | BODY MASS INDEX: 50.02 KG/M2 | WEIGHT: 293 LBS | TEMPERATURE: 96.9 F

## 2025-06-16 DIAGNOSIS — E66.01 MORBID (SEVERE) OBESITY DUE TO EXCESS CALORIES (HCC): Primary | ICD-10-CM

## 2025-06-16 PROCEDURE — 99214 OFFICE O/P EST MOD 30 MIN: CPT | Performed by: PHYSICIAN ASSISTANT

## 2025-06-16 RX ORDER — TIRZEPATIDE 10 MG/.5ML
10 INJECTION, SOLUTION SUBCUTANEOUS WEEKLY
Qty: 2 ML | Refills: 2 | Status: SHIPPED | OUTPATIENT
Start: 2025-06-16 | End: 2025-09-08

## 2025-06-16 NOTE — PROGRESS NOTES
Name: Teri Griggs      : 1985      MRN: [id number]  Encounter Provider: Shobha Bee PA-C  Encounter Date: 2025   Encounter department: [facility] [place]  :  Assessment & Plan  Morbid (severe) obesity due to excess calories (HCC)    Orders:    Comprehensive metabolic panel; Future    BMI 50.0-59.9, adult (HCC)    Orders:    Comprehensive metabolic panel; Future      -Discussed options of HealthyCORE-Intensive Lifestyle Intervention Program, Very Low Calorie Diet-VLCD, and Conservative Program and the role of weight loss medications  -Explained the importance of making lifestyle changes if utilizing medication to aid in weight loss  -Discussed that obesity is a chronic disease and medications are typically used long term as stopped medication will increase risk of weight gain.   -Initial weight loss goal of 5-10% weight loss for improved health  -Screening labs and records reviewed from prior  - STOP BANG- 3/8  - Initial Weight: 324 lbs  - Goal Weight: 225 lbs    -Patient is interested in pursuing Conservative Program    Goals:  -Recommend Referral to Registered Dietitian  -Recommend reduced Calorie diet, meal plan provided. Suggest Calorie tracking Terrence such as Lose It terrence or [employer].   -To drink at least 64-80oz of water daily. No sugary beverages.  -Recommend working up to at least 150 minutes of exercise per week including full body strength training 2x per week     Return Visit: 3 months  1) Continue Zepbound- increase to 10mg subcutaneously weekly.     - Side effects of Zepbound include nausea, vomiting, diarrhea, or constipation. Keep an eye on your heart rate while on Zepbound. If you resting heart rate is greater than 100 beats per minutes, please notify me. If you develop severe abdominal pain, stop Zepbound and go to the emergency room, as that could be a sign of pancreatitis.     - Please notify me if you have surgery, upper endoscopy, or colonoscopy  "scheduled, as we typically hold Zepbound for one week prior to the procedure.     - Zepbound can reduce the effectiveness of oral hormonal birth control (birth control pills). Recommend a barrier backup method such as condoms to prevent pregnancy.       2) Continue working with dietitian team; focus on protein and fiber goals     3) Referral to dietitian for meal planning visit   Nutrition RX:  - start tracking intake  - focus on protein- goal is 30 grams per meal; 90 grams per day  - focus on increasing fiber first by increasing vegetable servings per day  - do not skip breakfast and goal water intake 64 oz daily    Physical Activity RX:  - Goal is 150-200 mins of activity weekly.  Try to include at least 2 strength training sessions; increasing lean body mass will really help you to lose weight and maintain weight loss.      History of Present Illness   HPI    Waist circumference 49.5\" (3/4/25)   Waist circumference 49\" (4/20/25)  Current waist circumference: 44\"  Last visit weight:311 lbs  Current weight: 293 lbs   Neck 15.5\"  STOP-BANG 3/8  Goal weight 225 lbs   Highest weight 338 lbs     Teri Griggs is a 40 y.o. female who presents for weight management initial consultation.   History obtained from: patient    Patient presents for follow up today.  She has been on Zepbound 7.5mg weekly, no reported side effects. Appetite is suppressed, she does not feel a desire to overeat.  She does mention that her stools are lighter in color, no dark urine, no sceral icterus, no jaundice, no fevers or abdominal pain.     She has noticed her sleep has really improved. Her endurance is improving at the gym. She feels so much better overall. Food cravings are completely different, better  overall on food. She has been also noticing better mindfulness around eating and not falling into emotional eating traps like she has in the past.      She just started tracking on --1600 calories     Physical activity:  4 days " per week at the gym for strength training and then walking on off days   She is back at [facility] gym doing weight training   Seeing a difference in her endurance already, getting stronger   Walking around her block daily     Nutrition:  B- eggs, veggie, slice of bread   L- typically skips   D- protein, veggies, baked potato    Weight History:   Patient reports struggling with weight since childhood.   She lost her dad last year was very stressful and gained more weight at this time.  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     Weight watchers- started when she was 12 yo. She has worked with nutritionist several times as well over the years.      VALUE- wants to live a long time, has 2 kids, wants to be active and healthy for her family.     Feels tired but otherwise no other reported issues.  She did join a gym [facility]; lost weight since starting there 338 lbs to 324 lbs. She is going 2-3 times per week in personal training (>2 hrs of physical activity per week).  She is working on increasing her physical activity. She has been doing Whole30.    Diet Recall:  B- eggs, peppers, onions  L- protein, veggie, baked potato   D- tries to eat with family, chicken, tacos (shrimp, chicken)  Water- 80 oz on gym days, 40 oz otherwise   Does not like dairy     Not currently tracking calories, working on staying away from processed foods.     Family History- HTN and T2DM.  Moms side of family struggles with HTN, weight, colon cancer, T2DM.                                        Review of Systems  Pertinent Medical History   Colonoscopy- 2024   Holter monitor 2024- within normal limits; met with cardiology         Current Outpatient Medications on File Prior to Visit   Medication Sig Dispense Refill    escitalopram (LEXAPRO) 10 mg tablet Take 10 mg by mouth in the morning.      levonorgestrel (MIRENA) 20 MCG/24HR IUD 1 each by Intrauterine route once    "   tirzepatide (Zepbound) 7.5 mg/0.5 mL auto-injector Inject 0.5 mL (7.5 mg total) under the skin once a week 2 mL 1     No current facility-administered medications on file prior to visit.      Social History     Tobacco Use    Smoking status: Never    Smokeless tobacco: Never   Vaping Use    Vaping status: Never Used   Substance and Sexual Activity    Alcohol use: Not Currently     Comment: rarely    Drug use: Never    Sexual activity: Yes     Partners: Male     Birth control/protection: I.U.D.        Objective   /70 (BP Location: Left arm, Patient Position: Sitting)   Pulse 86   Temp (!) 96.9 °F (36.1 °C) (Tympanic)   Resp 16   Ht 5' 4\" (1.626 m)   Wt 133 kg (293 lb 3.2 oz)   BMI 50.33 kg/m²      Physical Exam  Vitals and nursing note reviewed.   Constitutional:       General: She is not in acute distress.     Appearance: She is well-developed.   HENT:      Head: Normocephalic and atraumatic.     Eyes:      Conjunctiva/sclera: Conjunctivae normal.       Cardiovascular:      Rate and Rhythm: Normal rate and regular rhythm.      Heart sounds: No murmur heard.  Pulmonary:      Effort: Pulmonary effort is normal. No respiratory distress.      Breath sounds: Normal breath sounds.   Abdominal:      Palpations: Abdomen is soft.      Tenderness: There is no abdominal tenderness.     Musculoskeletal:         General: No swelling.      Cervical back: Neck supple.     Skin:     General: Skin is warm and dry.      Capillary Refill: Capillary refill takes less than 2 seconds.     Neurological:      Mental Status: She is alert.     Psychiatric:         Mood and Affect: Mood normal.       Administrative Statements   I have spent a total time of 45 minutes in caring for this patient on the day of the visit/encounter including Instructions for management.  "

## 2025-06-19 ENCOUNTER — HOSPITAL ENCOUNTER (OUTPATIENT)
Dept: RADIOLOGY | Facility: IMAGING CENTER | Age: 40
End: 2025-06-19
Attending: OBSTETRICS & GYNECOLOGY
Payer: COMMERCIAL

## 2025-06-19 VITALS — HEIGHT: 64 IN | BODY MASS INDEX: 50.02 KG/M2 | WEIGHT: 293 LBS

## 2025-06-19 DIAGNOSIS — Z12.31 ENCOUNTER FOR SCREENING MAMMOGRAM FOR MALIGNANT NEOPLASM OF BREAST: ICD-10-CM

## 2025-06-19 PROCEDURE — 77067 SCR MAMMO BI INCL CAD: CPT

## 2025-06-19 PROCEDURE — 77063 BREAST TOMOSYNTHESIS BI: CPT

## 2025-06-20 ENCOUNTER — APPOINTMENT (OUTPATIENT)
Dept: LAB | Facility: CLINIC | Age: 40
End: 2025-06-20
Payer: COMMERCIAL

## 2025-06-20 DIAGNOSIS — E66.01 MORBID (SEVERE) OBESITY DUE TO EXCESS CALORIES (HCC): ICD-10-CM

## 2025-06-20 LAB
ALBUMIN SERPL BCG-MCNC: 3.8 G/DL (ref 3.5–5)
ALP SERPL-CCNC: 54 U/L (ref 34–104)
ALT SERPL W P-5'-P-CCNC: 11 U/L (ref 7–52)
ANION GAP SERPL CALCULATED.3IONS-SCNC: 9 MMOL/L (ref 4–13)
AST SERPL W P-5'-P-CCNC: 12 U/L (ref 13–39)
BILIRUB SERPL-MCNC: 0.51 MG/DL (ref 0.2–1)
BUN SERPL-MCNC: 7 MG/DL (ref 5–25)
CALCIUM SERPL-MCNC: 8.6 MG/DL (ref 8.4–10.2)
CHLORIDE SERPL-SCNC: 103 MMOL/L (ref 96–108)
CO2 SERPL-SCNC: 27 MMOL/L (ref 21–32)
CREAT SERPL-MCNC: 0.72 MG/DL (ref 0.6–1.3)
GFR SERPL CREATININE-BSD FRML MDRD: 104 ML/MIN/1.73SQ M
GLUCOSE P FAST SERPL-MCNC: 91 MG/DL (ref 65–99)
POTASSIUM SERPL-SCNC: 4 MMOL/L (ref 3.5–5.3)
PROT SERPL-MCNC: 6.3 G/DL (ref 6.4–8.4)
SODIUM SERPL-SCNC: 139 MMOL/L (ref 135–147)

## 2025-06-20 PROCEDURE — 80053 COMPREHEN METABOLIC PANEL: CPT

## 2025-06-20 PROCEDURE — 36415 COLL VENOUS BLD VENIPUNCTURE: CPT

## 2025-07-30 DIAGNOSIS — E66.01 MORBID (SEVERE) OBESITY DUE TO EXCESS CALORIES (HCC): ICD-10-CM

## 2025-07-31 RX ORDER — TIRZEPATIDE 10 MG/.5ML
10 INJECTION, SOLUTION SUBCUTANEOUS WEEKLY
Qty: 2 ML | Refills: 0 | Status: SHIPPED | OUTPATIENT
Start: 2025-07-31 | End: 2025-10-23

## 2025-08-01 ENCOUNTER — CLINICAL SUPPORT (OUTPATIENT)
Age: 40
End: 2025-08-01

## 2025-08-01 VITALS — HEIGHT: 64 IN | WEIGHT: 280 LBS | BODY MASS INDEX: 47.8 KG/M2

## 2025-08-01 DIAGNOSIS — R63.5 ABNORMAL WEIGHT GAIN: Primary | ICD-10-CM

## 2025-08-01 PROCEDURE — RECHECK
